# Patient Record
Sex: FEMALE | Race: WHITE | NOT HISPANIC OR LATINO | Employment: OTHER | URBAN - METROPOLITAN AREA
[De-identification: names, ages, dates, MRNs, and addresses within clinical notes are randomized per-mention and may not be internally consistent; named-entity substitution may affect disease eponyms.]

---

## 2018-01-01 ENCOUNTER — APPOINTMENT (INPATIENT)
Dept: ULTRASOUND IMAGING | Facility: HOSPITAL | Age: 83
DRG: 535 | End: 2018-01-01
Payer: MEDICARE

## 2018-01-01 ENCOUNTER — APPOINTMENT (INPATIENT)
Dept: CT IMAGING | Facility: HOSPITAL | Age: 83
DRG: 535 | End: 2018-01-01
Payer: MEDICARE

## 2018-01-01 ENCOUNTER — HOSPITAL ENCOUNTER (EMERGENCY)
Facility: HOSPITAL | Age: 83
Discharge: HOME | End: 2018-06-25
Attending: EMERGENCY MEDICINE | Admitting: EMERGENCY MEDICINE
Payer: MEDICARE

## 2018-01-01 ENCOUNTER — APPOINTMENT (EMERGENCY)
Dept: CT IMAGING | Facility: HOSPITAL | Age: 83
End: 2018-01-01
Payer: MEDICARE

## 2018-01-01 ENCOUNTER — HOSPITAL ENCOUNTER (EMERGENCY)
Facility: HOSPITAL | Age: 83
Discharge: HOME | End: 2018-08-06
Attending: EMERGENCY MEDICINE
Payer: MEDICARE

## 2018-01-01 ENCOUNTER — APPOINTMENT (EMERGENCY)
Dept: CT IMAGING | Facility: HOSPITAL | Age: 83
DRG: 535 | End: 2018-01-01
Payer: MEDICARE

## 2018-01-01 ENCOUNTER — APPOINTMENT (EMERGENCY)
Dept: RADIOLOGY | Facility: HOSPITAL | Age: 83
DRG: 535 | End: 2018-01-01
Payer: MEDICARE

## 2018-01-01 ENCOUNTER — HOSPITAL ENCOUNTER (INPATIENT)
Facility: HOSPITAL | Age: 83
LOS: 3 days | End: 2018-09-03
Attending: INTERNAL MEDICINE | Admitting: INTERNAL MEDICINE

## 2018-01-01 ENCOUNTER — HOSPITAL ENCOUNTER (INPATIENT)
Facility: HOSPITAL | Age: 83
LOS: 2 days | DRG: 535 | End: 2018-08-31
Attending: EMERGENCY MEDICINE | Admitting: INTERNAL MEDICINE
Payer: MEDICARE

## 2018-01-01 VITALS
SYSTOLIC BLOOD PRESSURE: 144 MMHG | TEMPERATURE: 97.3 F | DIASTOLIC BLOOD PRESSURE: 51 MMHG | WEIGHT: 120 LBS | OXYGEN SATURATION: 97 % | BODY MASS INDEX: 23.56 KG/M2 | HEIGHT: 60 IN | RESPIRATION RATE: 18 BRPM | HEART RATE: 56 BPM

## 2018-01-01 VITALS
BODY MASS INDEX: 22.52 KG/M2 | OXYGEN SATURATION: 93 % | WEIGHT: 122.38 LBS | HEIGHT: 62 IN | DIASTOLIC BLOOD PRESSURE: 61 MMHG | HEART RATE: 85 BPM | SYSTOLIC BLOOD PRESSURE: 146 MMHG | RESPIRATION RATE: 18 BRPM | TEMPERATURE: 97.6 F

## 2018-01-01 VITALS
SYSTOLIC BLOOD PRESSURE: 164 MMHG | TEMPERATURE: 97.3 F | DIASTOLIC BLOOD PRESSURE: 75 MMHG | OXYGEN SATURATION: 99 % | HEART RATE: 64 BPM | RESPIRATION RATE: 18 BRPM

## 2018-01-01 VITALS
OXYGEN SATURATION: 90 % | HEART RATE: 99 BPM | HEIGHT: 62 IN | SYSTOLIC BLOOD PRESSURE: 170 MMHG | RESPIRATION RATE: 18 BRPM | BODY MASS INDEX: 22.45 KG/M2 | TEMPERATURE: 97.5 F | DIASTOLIC BLOOD PRESSURE: 78 MMHG | WEIGHT: 122 LBS

## 2018-01-01 DIAGNOSIS — N17.9 AKI (ACUTE KIDNEY INJURY) (HCC): ICD-10-CM

## 2018-01-01 DIAGNOSIS — S32.591A CLOSED FRACTURE OF RIGHT INFERIOR PUBIC RAMUS, INITIAL ENCOUNTER (HCC): Primary | ICD-10-CM

## 2018-01-01 DIAGNOSIS — E86.0 DEHYDRATION: ICD-10-CM

## 2018-01-01 DIAGNOSIS — I63.9 CEREBROVASCULAR ACCIDENT (CVA), UNSPECIFIED MECHANISM (HCC): ICD-10-CM

## 2018-01-01 DIAGNOSIS — S32.511A CLOSED FRACTURE OF RIGHT SUPERIOR PUBIC RAMUS, INITIAL ENCOUNTER: ICD-10-CM

## 2018-01-01 DIAGNOSIS — R55 NEAR SYNCOPE: Primary | ICD-10-CM

## 2018-01-01 DIAGNOSIS — I10 ESSENTIAL HYPERTENSION: Primary | ICD-10-CM

## 2018-01-01 LAB
ABO GROUP BLD: NORMAL
ALBUMIN SERPL BCP-MCNC: 3 G/DL (ref 3.5–5.7)
ALBUMIN SERPL BCP-MCNC: 3.7 G/DL (ref 3.5–5.7)
ALP SERPL-CCNC: 48 U/L (ref 55–165)
ALP SERPL-CCNC: 72 U/L (ref 55–165)
ALT SERPL W P-5'-P-CCNC: 9 U/L (ref 7–52)
ALT SERPL W P-5'-P-CCNC: 9 U/L (ref 7–52)
ANION GAP SERPL CALCULATED.3IONS-SCNC: 8 MMOL/L (ref 4–13)
APTT PPP: 26 SECONDS (ref 24–36)
AST SERPL W P-5'-P-CCNC: 13 U/L (ref 13–39)
AST SERPL W P-5'-P-CCNC: 19 U/L (ref 13–39)
ATRIAL RATE: 62 BPM
ATRIAL RATE: 66 BPM
ATRIAL RATE: 98 BPM
BACTERIA UR QL AUTO: ABNORMAL /HPF
BASOPHILS # BLD AUTO: 0 THOUSANDS/ΜL (ref 0–0.1)
BASOPHILS # BLD AUTO: 0 THOUSANDS/ΜL (ref 0–0.1)
BASOPHILS # BLD AUTO: 0.1 THOUSANDS/ΜL (ref 0–0.1)
BASOPHILS NFR BLD AUTO: 0 % (ref 0–2)
BASOPHILS NFR BLD AUTO: 0 % (ref 0–2)
BASOPHILS NFR BLD AUTO: 1 % (ref 0–2)
BILIRUB SERPL-MCNC: 0.4 MG/DL (ref 0.2–1)
BILIRUB SERPL-MCNC: 0.5 MG/DL (ref 0.2–1)
BILIRUB UR QL STRIP: NEGATIVE
BLD GP AB SCN SERPL QL: NEGATIVE
BUN SERPL-MCNC: 23 MG/DL (ref 7–25)
BUN SERPL-MCNC: 24 MG/DL (ref 7–25)
BUN SERPL-MCNC: 33 MG/DL (ref 7–25)
BUN SERPL-MCNC: 47 MG/DL (ref 7–25)
CALCIUM SERPL-MCNC: 7.7 MG/DL (ref 8.6–10.5)
CALCIUM SERPL-MCNC: 8.7 MG/DL (ref 8.6–10.5)
CALCIUM SERPL-MCNC: 8.9 MG/DL (ref 8.6–10.5)
CALCIUM SERPL-MCNC: 9.4 MG/DL (ref 8.6–10.5)
CAOX CRY URNS QL MICRO: ABNORMAL /HPF
CHLORIDE SERPL-SCNC: 105 MMOL/L (ref 98–107)
CHLORIDE SERPL-SCNC: 106 MMOL/L (ref 98–107)
CHLORIDE SERPL-SCNC: 109 MMOL/L (ref 98–107)
CHLORIDE SERPL-SCNC: 116 MMOL/L (ref 98–107)
CHOLEST SERPL-MCNC: 152 MG/DL (ref 0–200)
CLARITY UR: CLEAR
CO2 SERPL-SCNC: 22 MMOL/L (ref 21–31)
CO2 SERPL-SCNC: 25 MMOL/L (ref 21–31)
CO2 SERPL-SCNC: 26 MMOL/L (ref 21–31)
CO2 SERPL-SCNC: 28 MMOL/L (ref 21–31)
COLOR UR: YELLOW
CREAT SERPL-MCNC: 1.01 MG/DL (ref 0.6–1.2)
CREAT SERPL-MCNC: 1.16 MG/DL (ref 0.6–1.2)
CREAT SERPL-MCNC: 2.07 MG/DL (ref 0.6–1.2)
CREAT SERPL-MCNC: 3.02 MG/DL (ref 0.6–1.2)
CREAT UR-MCNC: 205 MG/DL
EOSINOPHIL # BLD AUTO: 0 THOUSAND/ΜL (ref 0–0.61)
EOSINOPHIL # BLD AUTO: 0 THOUSAND/ΜL (ref 0–0.61)
EOSINOPHIL # BLD AUTO: 0.1 THOUSAND/ΜL (ref 0–0.61)
EOSINOPHIL NFR BLD AUTO: 0 % (ref 0–5)
EOSINOPHIL NFR BLD AUTO: 0 % (ref 0–5)
EOSINOPHIL NFR BLD AUTO: 1 % (ref 0–5)
ERYTHROCYTE [DISTWIDTH] IN BLOOD BY AUTOMATED COUNT: 13.8 % (ref 11.5–14.5)
ERYTHROCYTE [DISTWIDTH] IN BLOOD BY AUTOMATED COUNT: 13.8 % (ref 11.5–14.5)
ERYTHROCYTE [DISTWIDTH] IN BLOOD BY AUTOMATED COUNT: 14.2 % (ref 11.5–14.5)
ERYTHROCYTE [DISTWIDTH] IN BLOOD BY AUTOMATED COUNT: 14.5 % (ref 11.5–14.5)
FOLATE SERPL-MCNC: 14 NG/ML (ref 3.1–17.5)
GFR SERPL CREATININE-BSD FRML MDRD: 14 ML/MIN/1.73SQ M
GFR SERPL CREATININE-BSD FRML MDRD: 21 ML/MIN/1.73SQ M
GFR SERPL CREATININE-BSD FRML MDRD: 43 ML/MIN/1.73SQ M
GFR SERPL CREATININE-BSD FRML MDRD: 51 ML/MIN/1.73SQ M
GLUCOSE SERPL-MCNC: 112 MG/DL (ref 65–99)
GLUCOSE SERPL-MCNC: 131 MG/DL (ref 65–99)
GLUCOSE SERPL-MCNC: 180 MG/DL (ref 65–99)
GLUCOSE SERPL-MCNC: 89 MG/DL (ref 65–140)
GLUCOSE SERPL-MCNC: 95 MG/DL (ref 65–99)
GLUCOSE UR STRIP-MCNC: NEGATIVE MG/DL
HCT VFR BLD AUTO: 18.2 % (ref 34.8–46.1)
HCT VFR BLD AUTO: 21.9 % (ref 34.8–46.1)
HCT VFR BLD AUTO: 26.4 % (ref 34.8–46.1)
HCT VFR BLD AUTO: 33.5 % (ref 34.8–46.1)
HCT VFR BLD AUTO: 37.2 % (ref 34.8–46.1)
HDLC SERPL-MCNC: 33 MG/DL (ref 40–60)
HGB BLD-MCNC: 11 G/DL (ref 12–16)
HGB BLD-MCNC: 12.5 G/DL (ref 12–16)
HGB BLD-MCNC: 6.2 G/DL (ref 12–16)
HGB BLD-MCNC: 7.4 G/DL (ref 12–16)
HGB BLD-MCNC: 8.9 G/DL (ref 12–16)
HGB UR QL STRIP.AUTO: ABNORMAL
HYALINE CASTS #/AREA URNS LPF: ABNORMAL /LPF
INR PPP: 1.08 (ref 0.9–1.5)
KETONES UR STRIP-MCNC: NEGATIVE MG/DL
LDLC SERPL CALC-MCNC: 92 MG/DL (ref 75–193)
LEUKOCYTE ESTERASE UR QL STRIP: NEGATIVE
LYMPHOCYTES # BLD AUTO: 0.9 THOUSANDS/ΜL (ref 0.6–4.47)
LYMPHOCYTES # BLD AUTO: 0.9 THOUSANDS/ΜL (ref 0.6–4.47)
LYMPHOCYTES # BLD AUTO: 1.1 THOUSANDS/ΜL (ref 0.6–4.47)
LYMPHOCYTES NFR BLD AUTO: 12 % (ref 21–51)
LYMPHOCYTES NFR BLD AUTO: 12 % (ref 21–51)
LYMPHOCYTES NFR BLD AUTO: 8 % (ref 21–51)
MCH RBC QN AUTO: 30 PG (ref 26–34)
MCH RBC QN AUTO: 30.7 PG (ref 26–34)
MCH RBC QN AUTO: 31.2 PG (ref 26–34)
MCH RBC QN AUTO: 31.4 PG (ref 26–34)
MCHC RBC AUTO-ENTMCNC: 32.8 G/DL (ref 31–37)
MCHC RBC AUTO-ENTMCNC: 33.7 G/DL (ref 31–37)
MCHC RBC AUTO-ENTMCNC: 33.9 G/DL (ref 31–37)
MCHC RBC AUTO-ENTMCNC: 33.9 G/DL (ref 31–37)
MCV RBC AUTO: 91 FL (ref 81–99)
MCV RBC AUTO: 92 FL (ref 81–99)
MCV RBC AUTO: 92 FL (ref 81–99)
MCV RBC AUTO: 93 FL (ref 81–99)
MICROALBUMIN UR-MCNC: 298 MG/L (ref 0–20)
MICROALBUMIN/CREAT 24H UR: 145 MG/G CREATININE (ref 0–30)
MONOCYTES # BLD AUTO: 0.4 THOUSAND/ΜL (ref 0.17–1.22)
MONOCYTES # BLD AUTO: 0.5 THOUSAND/ΜL (ref 0.17–1.22)
MONOCYTES # BLD AUTO: 0.7 THOUSAND/ΜL (ref 0.17–1.22)
MONOCYTES NFR BLD AUTO: 5 % (ref 2–12)
MONOCYTES NFR BLD AUTO: 6 % (ref 2–12)
MONOCYTES NFR BLD AUTO: 6 % (ref 2–12)
MUCOUS THREADS UR QL AUTO: ABNORMAL
NEUTROPHILS # BLD AUTO: 6.2 THOUSANDS/ΜL (ref 1.4–6.5)
NEUTROPHILS # BLD AUTO: 7.4 THOUSANDS/ΜL (ref 1.4–6.5)
NEUTROPHILS # BLD AUTO: 9.8 THOUSANDS/ΜL (ref 1.4–6.5)
NEUTS SEG NFR BLD AUTO: 81 % (ref 42–75)
NEUTS SEG NFR BLD AUTO: 83 % (ref 42–75)
NEUTS SEG NFR BLD AUTO: 86 % (ref 42–75)
NITRITE UR QL STRIP: NEGATIVE
NON-SQ EPI CELLS URNS QL MICRO: ABNORMAL /HPF
NRBC BLD AUTO-RTO: 0 /100 WBCS
P AXIS: 35 DEGREES
P AXIS: 4 DEGREES
P AXIS: 55 DEGREES
PH UR STRIP.AUTO: 6 [PH] (ref 5–8)
PHOSPHATE SERPL-MCNC: 5.1 MG/DL (ref 3–5.5)
PLATELET # BLD AUTO: 128 THOUSANDS/UL (ref 149–390)
PLATELET # BLD AUTO: 174 THOUSANDS/UL (ref 149–390)
PLATELET # BLD AUTO: 189 THOUSANDS/UL (ref 149–390)
PLATELET # BLD AUTO: 208 THOUSANDS/UL (ref 149–390)
PMV BLD AUTO: 8.7 FL (ref 8.6–11.7)
PMV BLD AUTO: 9.2 FL (ref 8.6–11.7)
PMV BLD AUTO: 9.2 FL (ref 8.6–11.7)
PMV BLD AUTO: 9.7 FL (ref 8.6–11.7)
POTASSIUM SERPL-SCNC: 3.4 MMOL/L (ref 3.5–5.5)
POTASSIUM SERPL-SCNC: 3.6 MMOL/L (ref 3.5–5.5)
POTASSIUM SERPL-SCNC: 4.8 MMOL/L (ref 3.5–5.5)
POTASSIUM SERPL-SCNC: 5.2 MMOL/L (ref 3.5–5.5)
PR INTERVAL: 146 MS
PR INTERVAL: 156 MS
PR INTERVAL: 162 MS
PROT SERPL-MCNC: 5.7 G/DL (ref 6.4–8.9)
PROT SERPL-MCNC: 7.2 G/DL (ref 6.4–8.9)
PROT UR STRIP-MCNC: ABNORMAL MG/DL
PROT UR-MCNC: 63 MG/DL
PROT/CREAT UR: 0.31 MG/G{CREAT} (ref 0–0.1)
PROTHROMBIN TIME: 12.6 SECONDS (ref 10.1–12.9)
QRS AXIS: -1 DEGREES
QRS AXIS: -6 DEGREES
QRS AXIS: 3 DEGREES
QRSD INTERVAL: 126 MS
QRSD INTERVAL: 136 MS
QRSD INTERVAL: 144 MS
QT INTERVAL: 400 MS
QT INTERVAL: 448 MS
QT INTERVAL: 464 MS
QTC INTERVAL: 454 MS
QTC INTERVAL: 486 MS
QTC INTERVAL: 510 MS
RBC # BLD AUTO: 1.96 MILLION/UL (ref 3.9–5.2)
RBC # BLD AUTO: 2.87 MILLION/UL (ref 3.9–5.2)
RBC # BLD AUTO: 3.66 MILLION/UL (ref 3.9–5.2)
RBC # BLD AUTO: 4.08 MILLION/UL (ref 3.9–5.2)
RBC #/AREA URNS AUTO: ABNORMAL /HPF
RH BLD: POSITIVE
SODIUM 24H UR-SCNC: 90 MOL/L
SODIUM SERPL-SCNC: 138 MMOL/L (ref 134–143)
SODIUM SERPL-SCNC: 142 MMOL/L (ref 134–143)
SODIUM SERPL-SCNC: 143 MMOL/L (ref 134–143)
SODIUM SERPL-SCNC: 146 MMOL/L (ref 134–143)
SP GR UR STRIP.AUTO: 1.02 (ref 1–1.03)
SPECIMEN EXPIRATION DATE: NORMAL
T WAVE AXIS: -1 DEGREES
T WAVE AXIS: 17 DEGREES
T WAVE AXIS: 9 DEGREES
TRIGL SERPL-MCNC: 133 MG/DL (ref 44–166)
TROPONIN I SERPL-MCNC: <0.03 NG/ML
TSH SERPL DL<=0.05 MIU/L-ACNC: 1 UIU/ML (ref 0.45–5.33)
UROBILINOGEN UR QL STRIP.AUTO: 1 E.U./DL
VENTRICULAR RATE: 62 BPM
VENTRICULAR RATE: 66 BPM
VENTRICULAR RATE: 98 BPM
VIT B12 SERPL-MCNC: 697 PG/ML (ref 100–900)
WBC # BLD AUTO: 11.4 THOUSAND/UL (ref 4.8–10.8)
WBC # BLD AUTO: 7.6 THOUSAND/UL (ref 4.8–10.8)
WBC # BLD AUTO: 8.9 THOUSAND/UL (ref 4.8–10.8)
WBC # BLD AUTO: 8.9 THOUSAND/UL (ref 4.8–10.8)
WBC #/AREA URNS AUTO: ABNORMAL /HPF

## 2018-01-01 PROCEDURE — 93010 ELECTROCARDIOGRAM REPORT: CPT | Performed by: INTERNAL MEDICINE

## 2018-01-01 PROCEDURE — 82570 ASSAY OF URINE CREATININE: CPT | Performed by: INTERNAL MEDICINE

## 2018-01-01 PROCEDURE — 99239 HOSP IP/OBS DSCHRG MGMT >30: CPT | Performed by: NURSE PRACTITIONER

## 2018-01-01 PROCEDURE — 36415 COLL VENOUS BLD VENIPUNCTURE: CPT | Performed by: EMERGENCY MEDICINE

## 2018-01-01 PROCEDURE — 80053 COMPREHEN METABOLIC PANEL: CPT | Performed by: EMERGENCY MEDICINE

## 2018-01-01 PROCEDURE — 99231 SBSQ HOSP IP/OBS SF/LOW 25: CPT | Performed by: PHYSICIAN ASSISTANT

## 2018-01-01 PROCEDURE — 99283 EMERGENCY DEPT VISIT LOW MDM: CPT

## 2018-01-01 PROCEDURE — 99233 SBSQ HOSP IP/OBS HIGH 50: CPT | Performed by: NURSE PRACTITIONER

## 2018-01-01 PROCEDURE — 86900 BLOOD TYPING SEROLOGIC ABO: CPT | Performed by: EMERGENCY MEDICINE

## 2018-01-01 PROCEDURE — 85014 HEMATOCRIT: CPT | Performed by: NURSE PRACTITIONER

## 2018-01-01 PROCEDURE — 84443 ASSAY THYROID STIM HORMONE: CPT | Performed by: NURSE PRACTITIONER

## 2018-01-01 PROCEDURE — 85025 COMPLETE CBC W/AUTO DIFF WBC: CPT | Performed by: INTERNAL MEDICINE

## 2018-01-01 PROCEDURE — 99284 EMERGENCY DEPT VISIT MOD MDM: CPT

## 2018-01-01 PROCEDURE — 99222 1ST HOSP IP/OBS MODERATE 55: CPT | Performed by: INTERNAL MEDICINE

## 2018-01-01 PROCEDURE — 80048 BASIC METABOLIC PNL TOTAL CA: CPT | Performed by: NURSE PRACTITIONER

## 2018-01-01 PROCEDURE — 93005 ELECTROCARDIOGRAM TRACING: CPT

## 2018-01-01 PROCEDURE — 81001 URINALYSIS AUTO W/SCOPE: CPT | Performed by: INTERNAL MEDICINE

## 2018-01-01 PROCEDURE — 85018 HEMOGLOBIN: CPT | Performed by: NURSE PRACTITIONER

## 2018-01-01 PROCEDURE — 84156 ASSAY OF PROTEIN URINE: CPT | Performed by: INTERNAL MEDICINE

## 2018-01-01 PROCEDURE — 71045 X-RAY EXAM CHEST 1 VIEW: CPT

## 2018-01-01 PROCEDURE — 82746 ASSAY OF FOLIC ACID SERUM: CPT | Performed by: NURSE PRACTITIONER

## 2018-01-01 PROCEDURE — 70450 CT HEAD/BRAIN W/O DYE: CPT

## 2018-01-01 PROCEDURE — 85730 THROMBOPLASTIN TIME PARTIAL: CPT | Performed by: EMERGENCY MEDICINE

## 2018-01-01 PROCEDURE — 82043 UR ALBUMIN QUANTITATIVE: CPT | Performed by: INTERNAL MEDICINE

## 2018-01-01 PROCEDURE — 99285 EMERGENCY DEPT VISIT HI MDM: CPT

## 2018-01-01 PROCEDURE — 99221 1ST HOSP IP/OBS SF/LOW 40: CPT | Performed by: NURSE PRACTITIONER

## 2018-01-01 PROCEDURE — 84300 ASSAY OF URINE SODIUM: CPT | Performed by: INTERNAL MEDICINE

## 2018-01-01 PROCEDURE — 82948 REAGENT STRIP/BLOOD GLUCOSE: CPT

## 2018-01-01 PROCEDURE — 73502 X-RAY EXAM HIP UNI 2-3 VIEWS: CPT

## 2018-01-01 PROCEDURE — 85027 COMPLETE CBC AUTOMATED: CPT | Performed by: NURSE PRACTITIONER

## 2018-01-01 PROCEDURE — 80053 COMPREHEN METABOLIC PANEL: CPT | Performed by: INTERNAL MEDICINE

## 2018-01-01 PROCEDURE — 84484 ASSAY OF TROPONIN QUANT: CPT | Performed by: EMERGENCY MEDICINE

## 2018-01-01 PROCEDURE — 85025 COMPLETE CBC W/AUTO DIFF WBC: CPT | Performed by: EMERGENCY MEDICINE

## 2018-01-01 PROCEDURE — 86850 RBC ANTIBODY SCREEN: CPT | Performed by: EMERGENCY MEDICINE

## 2018-01-01 PROCEDURE — 96374 THER/PROPH/DIAG INJ IV PUSH: CPT

## 2018-01-01 PROCEDURE — 80061 LIPID PANEL: CPT | Performed by: NURSE PRACTITIONER

## 2018-01-01 PROCEDURE — 86901 BLOOD TYPING SEROLOGIC RH(D): CPT | Performed by: EMERGENCY MEDICINE

## 2018-01-01 PROCEDURE — 84100 ASSAY OF PHOSPHORUS: CPT | Performed by: INTERNAL MEDICINE

## 2018-01-01 PROCEDURE — 85610 PROTHROMBIN TIME: CPT | Performed by: EMERGENCY MEDICINE

## 2018-01-01 PROCEDURE — 82607 VITAMIN B-12: CPT | Performed by: NURSE PRACTITIONER

## 2018-01-01 PROCEDURE — 80048 BASIC METABOLIC PNL TOTAL CA: CPT | Performed by: EMERGENCY MEDICINE

## 2018-01-01 PROCEDURE — 72125 CT NECK SPINE W/O DYE: CPT

## 2018-01-01 RX ORDER — HYDROCHLOROTHIAZIDE 12.5 MG/1
12.5 CAPSULE, GELATIN COATED ORAL DAILY
COMMUNITY

## 2018-01-01 RX ORDER — ASPIRIN 300 MG/1
300 SUPPOSITORY RECTAL DAILY
Status: DISCONTINUED | OUTPATIENT
Start: 2018-01-01 | End: 2018-01-01 | Stop reason: HOSPADM

## 2018-01-01 RX ORDER — ATORVASTATIN CALCIUM 40 MG/1
40 TABLET, FILM COATED ORAL DAILY
Status: DISCONTINUED | OUTPATIENT
Start: 2018-01-01 | End: 2018-01-01

## 2018-01-01 RX ORDER — OXYCODONE HYDROCHLORIDE AND ACETAMINOPHEN 5; 325 MG/1; MG/1
1 TABLET ORAL EVERY 4 HOURS PRN
Status: DISCONTINUED | OUTPATIENT
Start: 2018-01-01 | End: 2018-01-01

## 2018-01-01 RX ORDER — ATORVASTATIN CALCIUM 10 MG/1
10 TABLET, FILM COATED ORAL DAILY
Status: DISCONTINUED | OUTPATIENT
Start: 2018-01-01 | End: 2018-01-01

## 2018-01-01 RX ORDER — MEMANTINE HYDROCHLORIDE 5 MG/1
5 TABLET ORAL 2 TIMES DAILY
Status: DISCONTINUED | OUTPATIENT
Start: 2018-01-01 | End: 2018-01-01

## 2018-01-01 RX ORDER — ACETAMINOPHEN 650 MG/1
650 SUPPOSITORY RECTAL EVERY 4 HOURS PRN
Status: DISCONTINUED | OUTPATIENT
Start: 2018-01-01 | End: 2018-01-01 | Stop reason: HOSPADM

## 2018-01-01 RX ORDER — NYSTATIN 100000 [USP'U]/G
1 POWDER TOPICAL 2 TIMES DAILY
COMMUNITY

## 2018-01-01 RX ORDER — MORPHINE SULFATE 2 MG/ML
1 INJECTION, SOLUTION INTRAMUSCULAR; INTRAVENOUS EVERY 4 HOURS PRN
Status: DISCONTINUED | OUTPATIENT
Start: 2018-01-01 | End: 2018-01-01

## 2018-01-01 RX ORDER — ASPIRIN 81 MG/1
81 TABLET, CHEWABLE ORAL DAILY
COMMUNITY

## 2018-01-01 RX ORDER — POTASSIUM CHLORIDE 750 MG/1
10 CAPSULE, EXTENDED RELEASE ORAL 3 TIMES DAILY
COMMUNITY

## 2018-01-01 RX ORDER — AMLODIPINE BESYLATE 5 MG/1
10 TABLET ORAL DAILY
COMMUNITY

## 2018-01-01 RX ORDER — MORPHINE SULFATE 2 MG/ML
2 INJECTION, SOLUTION INTRAMUSCULAR; INTRAVENOUS EVERY 6 HOURS SCHEDULED
Status: DISCONTINUED | OUTPATIENT
Start: 2018-01-01 | End: 2018-01-01

## 2018-01-01 RX ORDER — LORAZEPAM 2 MG/ML
1 INJECTION INTRAMUSCULAR
Status: DISCONTINUED | OUTPATIENT
Start: 2018-01-01 | End: 2018-09-04 | Stop reason: HOSPADM

## 2018-01-01 RX ORDER — MORPHINE SULFATE 2 MG/ML
1 INJECTION, SOLUTION INTRAMUSCULAR; INTRAVENOUS
Status: DISCONTINUED | OUTPATIENT
Start: 2018-01-01 | End: 2018-01-01 | Stop reason: HOSPADM

## 2018-01-01 RX ORDER — FENTANYL CITRATE 50 UG/ML
25 INJECTION, SOLUTION INTRAMUSCULAR; INTRAVENOUS ONCE
Status: COMPLETED | OUTPATIENT
Start: 2018-01-01 | End: 2018-01-01

## 2018-01-01 RX ORDER — ACETAMINOPHEN 325 MG/1
650 TABLET ORAL EVERY 6 HOURS PRN
Status: DISCONTINUED | OUTPATIENT
Start: 2018-01-01 | End: 2018-01-01

## 2018-01-01 RX ORDER — ATROPINE SULFATE 10 MG/ML
2 SOLUTION/ DROPS OPHTHALMIC EVERY 4 HOURS PRN
Status: DISCONTINUED | OUTPATIENT
Start: 2018-01-01 | End: 2018-09-04 | Stop reason: HOSPADM

## 2018-01-01 RX ORDER — ONDANSETRON 2 MG/ML
4 INJECTION INTRAMUSCULAR; INTRAVENOUS EVERY 4 HOURS PRN
Status: DISCONTINUED | OUTPATIENT
Start: 2018-01-01 | End: 2018-01-01 | Stop reason: HOSPADM

## 2018-01-01 RX ORDER — ASPIRIN 300 MG/1
300 SUPPOSITORY RECTAL ONCE
Status: COMPLETED | OUTPATIENT
Start: 2018-01-01 | End: 2018-01-01

## 2018-01-01 RX ORDER — MORPHINE SULFATE 2 MG/ML
2 INJECTION, SOLUTION INTRAMUSCULAR; INTRAVENOUS
Status: DISCONTINUED | OUTPATIENT
Start: 2018-01-01 | End: 2018-09-04 | Stop reason: HOSPADM

## 2018-01-01 RX ORDER — MORPHINE SULFATE 2 MG/ML
2 INJECTION, SOLUTION INTRAMUSCULAR; INTRAVENOUS EVERY 4 HOURS
Status: DISCONTINUED | OUTPATIENT
Start: 2018-01-01 | End: 2018-09-04 | Stop reason: HOSPADM

## 2018-01-01 RX ORDER — ATORVASTATIN CALCIUM 10 MG/1
10 TABLET, FILM COATED ORAL DAILY
COMMUNITY

## 2018-01-01 RX ORDER — SCOLOPAMINE TRANSDERMAL SYSTEM 1 MG/1
1 PATCH, EXTENDED RELEASE TRANSDERMAL
Status: DISCONTINUED | OUTPATIENT
Start: 2018-01-01 | End: 2018-09-04 | Stop reason: HOSPADM

## 2018-01-01 RX ORDER — ATROPINE SULFATE 10 MG/ML
2 SOLUTION/ DROPS OPHTHALMIC
Status: DISCONTINUED | OUTPATIENT
Start: 2018-01-01 | End: 2018-01-01

## 2018-01-01 RX ORDER — SODIUM CHLORIDE 9 MG/ML
75 INJECTION, SOLUTION INTRAVENOUS CONTINUOUS
Status: DISCONTINUED | OUTPATIENT
Start: 2018-01-01 | End: 2018-01-01

## 2018-01-01 RX ORDER — SODIUM CHLORIDE 9 MG/ML
60 INJECTION, SOLUTION INTRAVENOUS CONTINUOUS
Status: DISCONTINUED | OUTPATIENT
Start: 2018-01-01 | End: 2018-01-01 | Stop reason: HOSPADM

## 2018-01-01 RX ORDER — ASPIRIN 81 MG/1
81 TABLET, CHEWABLE ORAL DAILY
Status: DISCONTINUED | OUTPATIENT
Start: 2018-01-01 | End: 2018-01-01

## 2018-01-01 RX ORDER — SODIUM CHLORIDE 9 MG/ML
500 INJECTION, SOLUTION INTRAVENOUS ONCE
Status: COMPLETED | OUTPATIENT
Start: 2018-01-01 | End: 2018-01-01

## 2018-01-01 RX ORDER — MEMANTINE HYDROCHLORIDE 10 MG/1
5 TABLET ORAL 2 TIMES DAILY
COMMUNITY

## 2018-01-01 RX ORDER — ACETAMINOPHEN 650 MG/1
650 SUPPOSITORY RECTAL EVERY 6 HOURS PRN
Status: DISCONTINUED | OUTPATIENT
Start: 2018-01-01 | End: 2018-09-04 | Stop reason: HOSPADM

## 2018-01-01 RX ORDER — HALOPERIDOL 5 MG/ML
1 INJECTION INTRAMUSCULAR
Status: DISCONTINUED | OUTPATIENT
Start: 2018-01-01 | End: 2018-09-04 | Stop reason: HOSPADM

## 2018-01-01 RX ORDER — LOSARTAN POTASSIUM 50 MG/1
25 TABLET ORAL 2 TIMES DAILY
COMMUNITY

## 2018-01-01 RX ORDER — POTASSIUM CHLORIDE 20 MEQ/1
40 TABLET, EXTENDED RELEASE ORAL ONCE
Status: COMPLETED | OUTPATIENT
Start: 2018-01-01 | End: 2018-01-01

## 2018-01-01 RX ADMIN — SODIUM CHLORIDE 100 ML/HR: 9 INJECTION, SOLUTION INTRAVENOUS at 00:08

## 2018-01-01 RX ADMIN — MORPHINE SULFATE 2 MG: 2 INJECTION, SOLUTION INTRAMUSCULAR; INTRAVENOUS at 18:39

## 2018-01-01 RX ADMIN — LORAZEPAM 1 MG: 2 INJECTION INTRAMUSCULAR; INTRAVENOUS at 08:28

## 2018-01-01 RX ADMIN — LORAZEPAM 1 MG: 2 INJECTION INTRAMUSCULAR; INTRAVENOUS at 09:54

## 2018-01-01 RX ADMIN — SODIUM CHLORIDE 250 ML: 9 INJECTION, SOLUTION INTRAVENOUS at 10:00

## 2018-01-01 RX ADMIN — MORPHINE SULFATE 2 MG: 2 INJECTION, SOLUTION INTRAMUSCULAR; INTRAVENOUS at 11:50

## 2018-01-01 RX ADMIN — MORPHINE SULFATE 2 MG: 2 INJECTION, SOLUTION INTRAMUSCULAR; INTRAVENOUS at 00:31

## 2018-01-01 RX ADMIN — ENOXAPARIN SODIUM 30 MG: 100 INJECTION SUBCUTANEOUS at 20:53

## 2018-01-01 RX ADMIN — MORPHINE SULFATE 1 MG: 2 INJECTION, SOLUTION INTRAMUSCULAR; INTRAVENOUS at 20:52

## 2018-01-01 RX ADMIN — MEMANTINE 5 MG: 5 TABLET ORAL at 20:53

## 2018-01-01 RX ADMIN — MORPHINE SULFATE 2 MG: 2 INJECTION, SOLUTION INTRAMUSCULAR; INTRAVENOUS at 17:10

## 2018-01-01 RX ADMIN — SODIUM CHLORIDE 100 ML/HR: 9 INJECTION, SOLUTION INTRAVENOUS at 13:55

## 2018-01-01 RX ADMIN — MORPHINE SULFATE 2 MG: 2 INJECTION, SOLUTION INTRAMUSCULAR; INTRAVENOUS at 11:17

## 2018-01-01 RX ADMIN — MORPHINE SULFATE 2 MG: 2 INJECTION, SOLUTION INTRAMUSCULAR; INTRAVENOUS at 05:40

## 2018-01-01 RX ADMIN — MORPHINE SULFATE 2 MG: 2 INJECTION, SOLUTION INTRAMUSCULAR; INTRAVENOUS at 03:11

## 2018-01-01 RX ADMIN — MORPHINE SULFATE 2 MG: 2 INJECTION, SOLUTION INTRAMUSCULAR; INTRAVENOUS at 13:23

## 2018-01-01 RX ADMIN — MORPHINE SULFATE 2 MG: 2 INJECTION, SOLUTION INTRAMUSCULAR; INTRAVENOUS at 23:16

## 2018-01-01 RX ADMIN — MORPHINE SULFATE 2 MG: 2 INJECTION, SOLUTION INTRAMUSCULAR; INTRAVENOUS at 06:46

## 2018-01-01 RX ADMIN — MORPHINE SULFATE 1 MG: 2 INJECTION, SOLUTION INTRAMUSCULAR; INTRAVENOUS at 20:51

## 2018-01-01 RX ADMIN — MORPHINE SULFATE 2 MG: 2 INJECTION, SOLUTION INTRAMUSCULAR; INTRAVENOUS at 18:48

## 2018-01-01 RX ADMIN — MORPHINE SULFATE 2 MG: 2 INJECTION, SOLUTION INTRAMUSCULAR; INTRAVENOUS at 14:49

## 2018-01-01 RX ADMIN — MORPHINE SULFATE 2 MG: 2 INJECTION, SOLUTION INTRAMUSCULAR; INTRAVENOUS at 13:28

## 2018-01-01 RX ADMIN — SODIUM CHLORIDE 500 ML/HR: 0.9 INJECTION, SOLUTION INTRAVENOUS at 17:02

## 2018-01-01 RX ADMIN — ASPIRIN 300 MG: 300 SUPPOSITORY RECTAL at 09:46

## 2018-01-01 RX ADMIN — MORPHINE SULFATE 2 MG: 2 INJECTION, SOLUTION INTRAMUSCULAR; INTRAVENOUS at 23:42

## 2018-01-01 RX ADMIN — ASPIRIN 300 MG: 300 SUPPOSITORY RECTAL at 14:34

## 2018-01-01 RX ADMIN — MORPHINE SULFATE 2 MG: 2 INJECTION, SOLUTION INTRAMUSCULAR; INTRAVENOUS at 18:03

## 2018-01-01 RX ADMIN — ACETAMINOPHEN 650 MG: 650 SUPPOSITORY RECTAL at 14:45

## 2018-01-01 RX ADMIN — FENTANYL CITRATE 25 MCG: 50 INJECTION, SOLUTION INTRAMUSCULAR; INTRAVENOUS at 17:32

## 2018-01-01 RX ADMIN — SODIUM CHLORIDE 100 ML/HR: 9 INJECTION, SOLUTION INTRAVENOUS at 10:24

## 2018-01-01 RX ADMIN — SCOPALAMINE 1 PATCH: 1 PATCH, EXTENDED RELEASE TRANSDERMAL at 12:09

## 2018-01-01 RX ADMIN — MORPHINE SULFATE 2 MG: 2 INJECTION, SOLUTION INTRAMUSCULAR; INTRAVENOUS at 10:24

## 2018-01-01 RX ADMIN — SODIUM CHLORIDE 250 ML: 9 INJECTION, SOLUTION INTRAVENOUS at 07:59

## 2018-01-01 RX ADMIN — MORPHINE SULFATE 2 MG: 2 INJECTION, SOLUTION INTRAMUSCULAR; INTRAVENOUS at 11:15

## 2018-01-01 RX ADMIN — MORPHINE SULFATE 1 MG: 2 INJECTION, SOLUTION INTRAMUSCULAR; INTRAVENOUS at 05:20

## 2018-01-01 RX ADMIN — SODIUM CHLORIDE 75 ML/HR: 9 INJECTION, SOLUTION INTRAVENOUS at 09:44

## 2018-01-01 RX ADMIN — MORPHINE SULFATE 1 MG: 2 INJECTION, SOLUTION INTRAMUSCULAR; INTRAVENOUS at 10:14

## 2018-01-01 RX ADMIN — POTASSIUM CHLORIDE 40 MEQ: 1500 TABLET, EXTENDED RELEASE ORAL at 20:53

## 2018-01-01 RX ADMIN — MORPHINE SULFATE 2 MG: 2 INJECTION, SOLUTION INTRAMUSCULAR; INTRAVENOUS at 15:34

## 2018-01-01 RX ADMIN — MORPHINE SULFATE 2 MG: 2 INJECTION, SOLUTION INTRAMUSCULAR; INTRAVENOUS at 16:18

## 2018-01-01 RX ADMIN — SODIUM CHLORIDE 500 ML: 9 INJECTION, SOLUTION INTRAVENOUS at 10:38

## 2018-01-01 RX ADMIN — MORPHINE SULFATE 2 MG: 2 INJECTION, SOLUTION INTRAMUSCULAR; INTRAVENOUS at 14:52

## 2018-01-01 RX ADMIN — MORPHINE SULFATE 2 MG: 2 INJECTION, SOLUTION INTRAMUSCULAR; INTRAVENOUS at 05:53

## 2018-06-25 NOTE — DISCHARGE INSTRUCTIONS

## 2018-06-25 NOTE — ED PROVIDER NOTES
History  Chief Complaint   Patient presents with    Hypertension     patient resides at [facility] - has history of dementia - per ambulance she told staff her tongue burned and when they checked her BP it was elevated and they called 80     80year old pt with Dementia who lives at [address] [place] [address]  Pt complained to home caretakers that her tongue was burning, on checking pt's vital signs at the time, they found pt to be hypertensive, 191/71 and sent pt to ER for evaluation  Pt currently states her tongue feels much better  Prior to Admission Medications   Prescriptions Last Dose Informant Patient Reported? Taking? Cholecalciferol (VITAMIN D HIGH POTENCY PO)   Yes Yes   Sig: Take 2,000 Units by mouth daily   VALSARTAN PO   Yes Yes   Sig: Take 160 mg by mouth   amLODIPine (NORVASC) 5 mg tablet   Yes Yes   Sig: Take 5 mg by mouth daily   aspirin 81 mg chewable tablet   Yes Yes   Sig: Chew 81 mg daily   atorvastatin (LIPITOR) 10 mg tablet   Yes Yes   Sig: Take 10 mg by mouth daily   hydrochlorothiazide (MICROZIDE) 12 5 mg capsule   Yes Yes   Sig: Take 12 5 mg by mouth daily   memantine (NAMENDA) 10 mg tablet   Yes Yes   Sig: Take 5 mg by mouth 2 (two) times a day   nystatin (MYCOSTATIN) powder   Yes Yes   Sig: Apply 1 application topically 2 (two) times a day   potassium chloride (MICRO-K) 10 MEQ CR capsule   Yes Yes   Sig: Take 10 mEq by mouth 3 (three) times a day   sertraline (ZOLOFT) 50 mg tablet   Yes Yes   Sig: Take 75 mg by mouth daily      Facility-Administered Medications: None       Past Medical History:   Diagnosis Date    Dementia     Depression     Hypertension        No past surgical history on file  No family history on file  I have reviewed and agree with the history as documented  Social History   Substance Use Topics    Smoking status: Never Smoker    Smokeless tobacco: Never Used    Alcohol use Not on file        Review of Systems   Constitutional: Negative  HENT: Negative  Eyes: Negative  Respiratory: Negative  Cardiovascular: Negative  Gastrointestinal: Negative  Genitourinary: Negative  Musculoskeletal: Negative  Neurological: Negative  Psychiatric/Behavioral: Negative  Physical Exam  Physical Exam   Constitutional:   Thin for height  HENT:   Head: Normocephalic and atraumatic  Eyes: Conjunctivae and EOM are normal  Pupils are equal, round, and reactive to light  Neck: Normal range of motion  Neck supple  Cardiovascular: Normal rate, regular rhythm and normal heart sounds  Pulmonary/Chest: Effort normal and breath sounds normal    Abdominal: Soft  Bowel sounds are normal    Musculoskeletal: Normal range of motion  She exhibits no edema  Neurological: She is alert  Skin: Skin is warm and dry  Psychiatric: She has a normal mood and affect  Dementia prevents any lengthy conversation or persistent history from patient  Vital Signs  ED Triage Vitals [06/24/18 2220]   Temperature Pulse Respirations Blood Pressure SpO2   (!) 97 3 °F (36 3 °C) (!) 54 18 (!) 199/71 97 %      Temp Source Heart Rate Source Patient Position - Orthostatic VS BP Location FiO2 (%)   Oral -- -- Left arm --      Pain Score       No Pain           Vitals:    06/24/18 2229 06/24/18 2237 06/24/18 2240 06/24/18 2348   BP: (!) 178/98 170/90 (!) 174/76 145/65   Pulse:           Visual Acuity      ED Medications  Medications - No data to display    Diagnostic Studies  Results Reviewed     None                 No orders to display              Procedures  Procedures       Phone Contacts  ED Phone Contact    ED Course  ED Course as of Jun 25 0000   Sun Jun 24, 2018   2354 Blood pressure has been reviewed several times with 145/76 when last checked  I will discharge pt to home with no medication change                                   MDM  CritCare Time    Disposition  Final diagnoses:   Essential hypertension     Time reflects when diagnosis was documented in both MDM as applicable and the Disposition within this note     Time User Action Codes Description Comment    6/24/2018 11:56 PM Alisha Nagy Add [I10] Essential hypertension       ED Disposition     ED Disposition Condition Comment    Discharge  Gayle Ohiopyle discharge to home/self care  Condition at discharge: Good        Follow-up Information     Follow up With Specialties Details Why Contact Info        Follow up with your Family physician for routine care  Patient's Medications   Discharge Prescriptions    No medications on file     No discharge procedures on file      ED Provider  Electronically Signed by           Radha Steven MD  06/25/18 7840

## 2018-08-06 NOTE — DISCHARGE INSTRUCTIONS
Dehydration   WHAT YOU NEED TO KNOW:   Dehydration is a condition that develops when your body does not have enough fluid  You may become dehydrated if you do not drink enough water or lose too much fluid  Fluid loss may also cause loss of electrolytes (minerals), such as sodium  DISCHARGE INSTRUCTIONS:   Return to the emergency department if:   · You have a seizure  · You are confused or cannot think clearly  · You are extremely sleepy, or another person cannot wake you  · You become dizzy or faint when you stand  · You are not able to urinate  · You have trouble breathing  · You have a fast or irregular heartbeat  · Your hands or feet are cold, or your face is pale  Contact your healthcare provider if:   · You have trouble drinking liquids because you are vomiting  · Your symptoms get worse  · You have a fever  · You feel very weak or tired  · You have questions or concerns about your condition or care  Follow up with your healthcare provider as directed:  Write down your questions so you remember to ask them during your visits  Prevent or manage dehydration:   · Drink liquids as directed  Liquids that contain water, sugar, and minerals can help your body hold in fluid and help prevent dehydration  Drink liquids throughout the day, not just when you feel thirsty  Men should drink about 3 liters (13 eight-ounce cups) of liquid each day  Women should drink about 2 liters (9 eight-ounce cups) of liquid each day  Drink even more liquid if you will be outdoors, in the sun for a long time, or exercising  · Stay cool  Limit the time you spend outdoors during the hottest part of the day  Dress in lightweight clothes  · Keep track of how often you urinate  If you urinate less than usual or your urine is darker, drink more liquids    © 2017 [employer] Information is for End User's use only and may not be sold, redistributed or otherwise used for commercial purposes  All illustrations and images included in CareNotes® are the copyrighted property of [employer]  or Saurav Gonzalez  The above information is an  only  It is not intended as medical advice for individual conditions or treatments  Talk to your doctor, nurse or pharmacist before following any medical regimen to see if it is safe and effective for you  Near Syncope   WHAT YOU NEED TO KNOW:   Near syncope, also called presyncope, is the feeling that you may faint (lose consciousness), but you do not  You can control some health conditions that cause near syncope  Your healthcare providers can help you create a plan to manage near syncope and prevent episodes  DISCHARGE INSTRUCTIONS:   Return to the emergency department if:   · You have sudden chest pain  · You have trouble breathing or shortness of breath  · You have vision changes, are sweating, and have nausea while you are sitting or lying down  · You feel dizzy or flushed and your heart is fluttering  · You lose consciousness  · You cannot use your arm, hand, foot, or leg, or it feels weak  · You have trouble speaking or understanding others when they speak  Contact your healthcare provider if:   · You have new or worsening symptoms  · Your heart beats faster or slower than usual      · You have questions or concerns about your condition or care  Follow up with your healthcare provider as directed: You may need more tests to help find the cause of your near syncope episodes  The tests will help healthcare providers plan the best treatment for you  Write down your questions so you remember to ask them during your visits  Manage near syncope:   · Sit or lie down when needed  This includes when you feel dizzy, your throat is getting tight, and your vision changes  Raise your legs above the level of your heart  · Take slow, deep breaths if you start to breathe faster with anxiety or fear    This can help decrease dizziness and the feeling that you might faint  · Keep a record of your near syncope episodes  Include your symptoms and your activity before and after the episode  The record can help your healthcare provider find the cause of your near syncope and help you manage episodes  Prevent a near syncope episode:   · Move slowly and let yourself get used to one position before you move to another position  This is very important when you change from a lying or sitting position to a standing position  Take some deep breaths before you stand up from a lying position  Stand up slowly  Sudden movements may cause a fainting spell  Sit on the side of the bed or couch for a few minutes before you stand up  If you are on bedrest, try to be upright for about 2 hours each day, or as directed  Do not lock your legs if you are standing for a long period of time  Move your legs and bend your knees to keep blood flowing  · Follow your healthcare provider's recommendations  Your provider may  recommend that you drink more liquids to prevent dehydration  You may also need to have more salt to keep your blood pressure from dropping too low and causing syncope  Your provider will tell you how much liquid and sodium to have each day  · Watch for signs of low blood sugar  These include hunger, nervousness, sweating, and fast or fluttery heartbeats  Talk with your healthcare provider about ways to keep your blood sugar level steady  · Check your blood pressure often  This is important if you take medicine to lower your blood pressure  Check your blood pressure when you are lying down and when you are standing  Ask how often to check during the day  Keep a record of your blood pressure numbers  Your healthcare provider may use the record to help plan your treatment  · Do not strain if you are constipated  You may faint if you strain to have a bowel movement   Walking is the best way to get your bowels moving  Eat foods high in fiber to make it easier to have a bowel movement  Good examples are high-fiber cereals, beans, vegetables, and whole-grain breads  Prune juice may help make bowel movements softer  · Do not exercise outside on a hot day  The combination of physical activity and heat can lead to dehydration  This can cause syncope  © 2017 [employer] Information is for End User's use only and may not be sold, redistributed or otherwise used for commercial purposes  All illustrations and images included in CareNotes® are the copyrighted property of [employer]  or Saurav Gonzalez  The above information is an  only  It is not intended as medical advice for individual conditions or treatments  Talk to your doctor, nurse or pharmacist before following any medical regimen to see if it is safe and effective for you

## 2018-08-06 NOTE — ED PROVIDER NOTES
History  Chief Complaint   Patient presents with    Syncope     syncope x 1  reported by personal care home     This is an 70-year-old female who resides in an assisted living who has dementia  Patient is nonverbal   According to the staff at the assisted living patient was getting up to walk today and she had a presyncopal episode  They helped her to the floor  She did not hit her head  She had no nausea she had no vomiting  Patient does shake her head appropriately when asked questions  She denies nausea vomiting  Denies chest pain  She denies shortness of breath  Patient does follow commands  Nursing home denies any focal neuro deficits  No aggravating or alleviating factors            Prior to Admission Medications   Prescriptions Last Dose Informant Patient Reported? Taking? Cholecalciferol (VITAMIN D HIGH POTENCY PO)   Yes No   Sig: Take 2,000 Units by mouth daily   VALSARTAN PO   Yes No   Sig: Take 160 mg by mouth   amLODIPine (NORVASC) 5 mg tablet   Yes No   Sig: Take 10 mg by mouth daily     aspirin 81 mg chewable tablet   Yes No   Sig: Chew 81 mg daily   atorvastatin (LIPITOR) 10 mg tablet   Yes No   Sig: Take 10 mg by mouth daily   hydrochlorothiazide (MICROZIDE) 12 5 mg capsule   Yes No   Sig: Take 12 5 mg by mouth daily   losartan (COZAAR) 50 mg tablet   Yes Yes   Sig: Take 25 mg by mouth 2 (two) times a day   memantine (NAMENDA) 10 mg tablet   Yes No   Sig: Take 5 mg by mouth 2 (two) times a day   nystatin (MYCOSTATIN) powder   Yes No   Sig: Apply 1 application topically 2 (two) times a day   potassium chloride (MICRO-K) 10 MEQ CR capsule   Yes No   Sig: Take 10 mEq by mouth 3 (three) times a day   sertraline (ZOLOFT) 50 mg tablet   Yes No   Sig: Take 75 mg by mouth daily      Facility-Administered Medications: None       Past Medical History:   Diagnosis Date    Dementia     Depression     Hypertension        History reviewed  No pertinent surgical history  History reviewed   No pertinent family history  I have reviewed and agree with the history as documented  Social History   Substance Use Topics    Smoking status: Never Smoker    Smokeless tobacco: Never Used    Alcohol use No        Review of Systems   Unable to perform ROS: Dementia   Constitutional: Negative for chills and fever  HENT: Negative for rhinorrhea and sore throat  Eyes: Negative for visual disturbance  Respiratory: Negative for cough and shortness of breath  Cardiovascular: Negative for chest pain and leg swelling  Gastrointestinal: Negative for abdominal pain, diarrhea, nausea and vomiting  Genitourinary: Negative for dysuria  Musculoskeletal: Negative for back pain and myalgias  Skin: Negative for rash  Neurological: Negative for dizziness and headaches  Psychiatric/Behavioral: Negative for confusion  All other systems reviewed and are negative  Physical Exam  Physical Exam   Constitutional: She appears well-developed and well-nourished  HENT:   Head: Normocephalic and atraumatic  Right Ear: External ear normal    Left Ear: External ear normal    Nose: Nose normal    Mouth/Throat: Oropharynx is clear and moist    Eyes: Conjunctivae and EOM are normal    Neck: Normal range of motion  Neck supple  Cardiovascular: Normal rate, regular rhythm, normal heart sounds and intact distal pulses  Pulmonary/Chest: Effort normal    Abdominal: Soft  Bowel sounds are normal    Musculoskeletal: Normal range of motion  She exhibits no edema  Neurological: She is alert  The patient is nonverbal but does follow instructions and commands   Skin: Skin is warm and dry  Capillary refill takes less than 2 seconds     Psychiatric:   Calm and cooperative  Flat affect  Non verbal       Vital Signs  ED Triage Vitals [08/06/18 1631]   Temperature Pulse Respirations Blood Pressure SpO2   (!) 97 3 °F (36 3 °C) 64 18 168/74 98 %      Temp Source Heart Rate Source Patient Position - Orthostatic VS BP Location FiO2 (%)   Temporal Monitor Lying Left arm --      Pain Score       No Pain           Vitals:    08/06/18 1631   BP: 168/74   Pulse: 64   Patient Position - Orthostatic VS: Lying       Visual Acuity      ED Medications  Medications   sodium chloride 0 9 % infusion (not administered)       Diagnostic Studies  Results Reviewed     Procedure Component Value Units Date/Time    CBC and differential [[id number]]     Lab Status:  No result Specimen:  Blood     Comprehensive metabolic panel [[id number]]     Lab Status:  No result Specimen:  Blood     Troponin I [[id number]]     Lab Status:  No result Specimen:  Blood                  CT head without contrast    (Results Pending)              Procedures  Procedures       Phone Contacts  ED Phone Contact    ED Course        sent to the ED from the assisted living that she lives in  The patient's daughter also came to the ED  She states the patient I had a presyncopal episode  She was walking and she appeared to be off-balance and basophils her to the floor  Patient is at her baseline in the ED here  CT scan did not show any acute findings  Patient's labs are within normal limits  Number discussed the lack of any acute findings with family  The patient is completely nonverbal   However she does follow commands  The patient was sent back to the assisted living  [facility] Time    Disposition  Final diagnoses:   None     ED Disposition     None      Follow-up Information    None         Patient's Medications   Discharge Prescriptions    No medications on file     No discharge procedures on file      ED Provider  Electronically Signed by           Gildardo Kam MD  08/08/18 4429

## 2018-08-29 PROBLEM — S32.591A CLOSED FRACTURE OF MULTIPLE PUBIC RAMI, RIGHT, INITIAL ENCOUNTER (HCC): Status: ACTIVE | Noted: 2018-01-01

## 2018-08-29 NOTE — H&P
H&P- Crow Dior 1933, 80 y o  female MRN: [id number]    Unit/Bed#: ED 04 Encounter: [id number]    Primary Care Provider: HOLLY Gaviria   Date and time admitted to hospital: 8/29/2018  3:45 PM        * Closed fracture of multiple pubic rami, right, initial encounter [facility])   Assessment & Plan    · Consult Orthopedic surgery  · DVT prophylaxis with Lovenox  · PT/OT  · Pain control  · Fall precautions        Hypokalemia   Assessment & Plan    · Replete potassium        Hyperlipidemia   Assessment & Plan    · Continue statin        Hypertension, essential   Assessment & Plan    · Patient noted to be on the hypotensive side in the emergency department  · I will hold antihypertensives for now        Dementia   Assessment & Plan    · History of severe Alzheimer's dementia  · Continue Namenda  · Family are interested in nursing home placement, will discuss with case management              VTE Prophylaxis: Enoxaparin (Lovenox)  Code Status: DNR/DNI  POLST: POLST is not applicable to this patient  Discussion with family: and son at bedside    Anticipated Length of Stay:  Patient will be admitted on an Inpatient basis with an anticipated length of stay of  > 2 midnights  Justification for Hospital Stay: pubic rami fracture    Total Time for Visit, including Counseling / Coordination of Care: 45 minutes  Greater than 50% of this total time spent on direct patient counseling and coordination of care  Chief Complaint:   fall    History of Present Illness:    Crow Dior is a 80 y o  female who presents with a mechanical fall  Patient has a history of advanced Alzheimer's dementia, and multiple mechanical falls in the past   Patient lives at a personal care facility and was brought into the emergency department after a mechanical fall  She appeared to be in pain and imaging studies revealed multiple pubic rami fractures on the right    Due to the patient's advanced Alzheimer's dementia, she is unable to provide a history  Majority of the history is obtained from medical record,  and son who were both at bedside  Patient was given pain medications in the emergency department and her pain appears to be well controlled  Given that she has had numerous falls at her personal care facility, family are considering nursing home placement  Review of Systems:  Review of Systems   Unable to perform ROS: Dementia       Past Medical and Surgical History:   Past Medical History:   Diagnosis Date    Dementia     Depression     Hypertension        History reviewed  No pertinent surgical history  Meds/Allergies:  Prior to Admission medications    Medication Sig Start Date End Date Taking? Authorizing Provider   amLODIPine (NORVASC) 5 mg tablet Take 10 mg by mouth daily      Historical Provider, MD   aspirin 81 mg chewable tablet Chew 81 mg daily    Historical Provider, MD   atorvastatin (LIPITOR) 10 mg tablet Take 10 mg by mouth daily    Historical Provider, MD   Cholecalciferol (VITAMIN D HIGH POTENCY PO) Take 2,000 Units by mouth daily    Historical Provider, MD   hydrochlorothiazide (MICROZIDE) 12 5 mg capsule Take 12 5 mg by mouth daily    Historical Provider, MD   losartan (COZAAR) 50 mg tablet Take 25 mg by mouth 2 (two) times a day    Historical Provider, MD   memantine (NAMENDA) 10 mg tablet Take 5 mg by mouth 2 (two) times a day    Historical Provider, MD   nystatin (MYCOSTATIN) powder Apply 1 application topically 2 (two) times a day    Historical Provider, MD   potassium chloride (MICRO-K) 10 MEQ CR capsule Take 10 mEq by mouth 3 (three) times a day    Historical Provider, MD   sertraline (ZOLOFT) 50 mg tablet Take 75 mg by mouth daily  8/29/18  Historical Provider, MD   VALSARTAN PO Take 160 mg by mouth  8/29/18  Historical Provider, MD     I have reviewed home medications with patient family member      Allergies: No Known Allergies    Social History:  Marital Status: Single Occupation: unemployed  Patient Pre-hospital Living Situation: home  Patient Pre-hospital Level of Mobility: limited  Patient Pre-hospital Diet Restrictions: none  Substance Use History:     History   Alcohol Use No     History   Smoking Status    Never Smoker   Smokeless Tobacco    Never Used     History   Drug Use No       Family History:  I have reviewed the patients family history    Physical Exam:   Vitals:   Blood Pressure: 125/58 (08/29/18 1800)  Pulse: 74 (08/29/18 1815)  Temperature: (!) 97 3 °F (36 3 °C) (08/29/18 1546)  Temp Source: Tympanic (08/29/18 1546)  Respirations: 16 (08/29/18 1546)  SpO2: 96 % (08/29/18 1815)    Physical Exam   Constitutional: She appears well-developed  No distress  HENT:   Head: Normocephalic and atraumatic  Eyes: EOM are normal  Pupils are equal, round, and reactive to light  Neck: Normal range of motion  Neck supple  Cardiovascular: Normal rate, regular rhythm and normal heart sounds  Pulmonary/Chest: Effort normal and breath sounds normal  No respiratory distress  Abdominal: Soft  Bowel sounds are normal  She exhibits no distension  There is no tenderness  Musculoskeletal: She exhibits tenderness and deformity  She exhibits no edema  Neurological: She is alert  Nonverbal at baseline   Skin: Skin is warm  No erythema  Psychiatric:   Unable to assess   Nursing note and vitals reviewed  Additional Data:   Lab Results: I have personally reviewed pertinent reports          Results from last 7 days  Lab Units 08/29/18  1647   WBC Thousand/uL 11 40*   HEMOGLOBIN g/dL 11 0*   HEMATOCRIT % 33 5*   PLATELETS Thousands/uL 189   NEUTROS PCT % 86*   LYMPHS PCT % 8*   MONOS PCT % 6   EOS PCT % 0       Results from last 7 days  Lab Units 08/29/18  1647   SODIUM mmol/L 142   POTASSIUM mmol/L 3 4*   CHLORIDE mmol/L 106   CO2 mmol/L 28   BUN mg/dL 24   CREATININE mg/dL 1 16   CALCIUM mg/dL 8 9       Results from last 7 days  Lab Units 08/29/18  1648   INR  1 08 Imaging: I have personally reviewed pertinent reports  XR chest 1 view   Final Result by Interface, Radiology Results In (08/29 1727)   Normal chest evaluation for age 80 years  The lungs are clear  The chest is stable in interval since October 2016              Signed by Kailash Piper MD      XR hip/pelv 2-3 vws left if performed   Final Result by Evelin Banegas (08/29 1725)   No acute bony injury seen to the left hip  Acute fractures of the right superior and inferior pubic rami, adverse   change from prior  Signed by Evelin Banegas MD      CT spine cervical without contrast   Final Result by Andrew Saleem (08/29 1647)   No acute intracranial hemorrhage or fracture line is seen  There is a small subgaleal hematoma in the right frontal region  No acute fracture or traumatic subluxation is seen  Mild degenerative changes are again seen in the cervical spine  Signed by Jaja Farias MD      CT head without contrast   Final Result by Andrew Saleem (08/29 1647)   No acute intracranial hemorrhage or fracture line is seen  There is a small subgaleal hematoma in the right frontal region  No acute fracture or traumatic subluxation is seen  Mild degenerative changes are again seen in the cervical spine  Signed by Jaja Farias MD          EKG, Pathology, and Other Studies Reviewed on Admission:   · EKG: n/a    NetAccess/Epic Records Reviewed: Yes          ** Please Note: This note has been constructed using a voice recognition system   **

## 2018-08-29 NOTE — ASSESSMENT & PLAN NOTE
· Patient noted to be on the hypotensive side in the emergency department  · I will hold antihypertensives for now

## 2018-08-29 NOTE — ASSESSMENT & PLAN NOTE
· History of severe Alzheimer's dementia  · Continue Namenda  · Family are interested in nursing home placement, will discuss with case management

## 2018-08-29 NOTE — ED PROVIDER NOTES
History  Chief Complaint   Patient presents with    Fall     Pain to right hip, groin, right side of head     PATIENT HAS DEMENTIA AND LIVES AT A PERSONAL CARE HOME AND OFFERS A VERY LIMITED HISTORY  SHE APPARENTLY STOOD UP INDEPENDENTLY WITHOUT HER WALKER, AND FELL, WITH A VISIBLE CONTUSION ON HER RIGHT TEMPLE  BROUGHT TO THE ER VIA EMS IN A CERVICAL COLLAR  SHE INDICATES RIGHT HIP PAIN ON EXAM, BUT CANNOT OR DOES NOT VERBALLY INDICATED ANY OTHER COMPLAINTS  WHEN ASKED, SHE DOES NOT REPORT A HEADACHE, VISUAL CHANGE, NECK PAIN, OR UPPER EXTREMITY PAIN OR CHEST PAIN  Prior to Admission Medications   Prescriptions Last Dose Informant Patient Reported? Taking? Cholecalciferol (VITAMIN D HIGH POTENCY PO)   Yes No   Sig: Take 2,000 Units by mouth daily   amLODIPine (NORVASC) 5 mg tablet   Yes No   Sig: Take 10 mg by mouth daily     aspirin 81 mg chewable tablet   Yes No   Sig: Chew 81 mg daily   atorvastatin (LIPITOR) 10 mg tablet   Yes No   Sig: Take 10 mg by mouth daily   hydrochlorothiazide (MICROZIDE) 12 5 mg capsule   Yes No   Sig: Take 12 5 mg by mouth daily   losartan (COZAAR) 50 mg tablet   Yes No   Sig: Take 25 mg by mouth 2 (two) times a day   memantine (NAMENDA) 10 mg tablet   Yes No   Sig: Take 5 mg by mouth 2 (two) times a day   nystatin (MYCOSTATIN) powder   Yes No   Sig: Apply 1 application topically 2 (two) times a day   potassium chloride (MICRO-K) 10 MEQ CR capsule   Yes No   Sig: Take 10 mEq by mouth 3 (three) times a day      Facility-Administered Medications: None       Past Medical History:   Diagnosis Date    Dementia     Depression     Hypertension        History reviewed  No pertinent surgical history  History reviewed  No pertinent family history  I have reviewed and agree with the history as documented      Social History   Substance Use Topics    Smoking status: Never Smoker    Smokeless tobacco: Never Used    Alcohol use No        Review of Systems   Unable to perform ROS: Dementia   Constitutional: Negative for chills and fever  Physical Exam  Physical Exam   Constitutional: She appears well-developed and well-nourished  HENT:   Head: Normocephalic and atraumatic  Eyes: Conjunctivae and EOM are normal    Neck:   WITH IN RIGID COLLAR, NO PALPABLE SPINOUS TENDERNESS  EXCESS LORDOSIS   Cardiovascular: Normal rate, regular rhythm and normal heart sounds  No murmur heard  Pulmonary/Chest: Effort normal and breath sounds normal  She exhibits no tenderness  NONTENDER CHEST WALL AND CLAVICLES   Abdominal: Soft  Bowel sounds are normal  She exhibits no distension  There is no tenderness  Musculoskeletal:   NON ROTATED BUT SLIGHTLY SHORTENED RIGHT LOWER EXTREMITY  TENDERNESS OF PALPATION OF THE RIGHT GREATER TROCHANTER AREA AND PUBIC SYMPHYSIS   Neurological: No cranial nerve deficit  ORIENTED TO NAME ONLY  MINIMAL VERBAL RESPONSIVENESS  Skin: Skin is warm and dry  Capillary refill takes less than 2 seconds  No erythema         Vital Signs  ED Triage Vitals [08/29/18 1546]   Temperature Pulse Respirations Blood Pressure SpO2   (!) 97 3 °F (36 3 °C) 74 16 146/66 94 %      Temp Source Heart Rate Source Patient Position - Orthostatic VS BP Location FiO2 (%)   Tympanic Monitor Lying Left arm --      Pain Score       --           Vitals:    08/29/18 1546 08/29/18 1730 08/29/18 1800 08/29/18 1815   BP: 146/66 (!) 88/51 125/58    Pulse: 74 78 76 74   Patient Position - Orthostatic VS: Lying          Visual Acuity      ED Medications  Medications    EMS REPLENISHMENT MED (not administered)   fentanyl citrate (PF) 100 MCG/2ML 25 mcg (25 mcg Intravenous Given 8/29/18 1732)       Diagnostic Studies  Results Reviewed     Procedure Component Value Units Date/Time    Basic metabolic panel [[id number]]  (Abnormal) Collected:  08/29/18 1647    Lab Status:  Final result Specimen:  Blood from Arm, Left Updated:  08/29/18 1721     Sodium 142 mmol/L      Potassium 3 4 (L) mmol/L Chloride 106 mmol/L      CO2 28 mmol/L      ANION GAP 8 mmol/L      BUN 24 mg/dL      Creatinine 1 16 mg/dL      Glucose 131 (H) mg/dL      Calcium 8 9 mg/dL      eGFR 43 ml/min/1 73sq m     Narrative:         National Kidney Disease Education Program recommendations are as follows:  GFR calculation is accurate only with a steady state creatinine  Chronic Kidney disease less than 60 ml/min/1 73 sq  meters  Kidney failure less than 15 ml/min/1 73 sq  meters  Protime-INR [[id number]]  (Normal) Collected:  08/29/18 1648    Lab Status:  Final result Specimen:  Blood from Arm, Left Updated:  08/29/18 1709     Protime 12 6 seconds      INR 1 08    APTT [[id number]]  (Normal) Collected:  08/29/18 1648    Lab Status:  Final result Specimen:  Blood from Arm, Left Updated:  08/29/18 1709     PTT 26 seconds     CBC and differential [[id number]]  (Abnormal) Collected:  08/29/18 1647    Lab Status:  Final result Specimen:  Blood from Arm, Left Updated:  08/29/18 1655     WBC 11 40 (H) Thousand/uL      RBC 3 66 (L) Million/uL      Hemoglobin 11 0 (L) g/dL      Hematocrit 33 5 (L) %      MCV 92 fL      MCH 30 0 pg      MCHC 32 8 g/dL      RDW 13 8 %      MPV 8 7 fL      Platelets 965 Thousands/uL      nRBC 0 /100 WBCs      Neutrophils Relative 86 (H) %      Lymphocytes Relative 8 (L) %      Monocytes Relative 6 %      Eosinophils Relative 0 %      Basophils Relative 0 %      Neutrophils Absolute 9 80 (H) Thousands/µL      Lymphocytes Absolute 0 90 Thousands/µL      Monocytes Absolute 0 70 Thousand/µL      Eosinophils Absolute 0 00 Thousand/µL      Basophils Absolute 0 00 Thousands/µL                  XR chest 1 view   Final Result by Interface, Radiology Results In (08/29 1727)   Normal chest evaluation for age 80 years  The lungs are clear  The chest is stable in interval since October 2016                    Signed by Chun Freed MD      XR hip/pelv 2-3 vws left if performed   Final Result by Nikolas Hein (08/29 1725)   No acute bony injury seen to the left hip  Acute fractures of the right superior and inferior pubic rami, adverse   change from prior  Signed by Alisia Burks MD      CT spine cervical without contrast   Final Result by Andrew Saleem (08/29 1647)   No acute intracranial hemorrhage or fracture line is seen  There is a small subgaleal hematoma in the right frontal region  No acute fracture or traumatic subluxation is seen  Mild degenerative changes are again seen in the cervical spine  Signed by Silvestre Palma MD      CT head without contrast   Final Result by Andrew Saleem (08/29 1647)   No acute intracranial hemorrhage or fracture line is seen  There is a small subgaleal hematoma in the right frontal region  No acute fracture or traumatic subluxation is seen  Mild degenerative changes are again seen in the cervical spine  Signed by Silvestre Palma MD                 Procedures  Procedures       Phone Contacts  ED Phone Contact    ED Course    PATIENT UNDERWENT CT SCAN OF THE HEAD AND C-SPINE  NO INTRACRANIAL HEMORRHAGE  NO BONY INJURY  X-RAY FILMS OF THE HIPS AND PELVIS WERE REVIEWED, INFERIOR AND SUPERIOR PUBIC RAMI FRACTURE EVIDENT, NO FEMORAL NECK FRACTURES  LABORATORY STUDIES DID NOT SHOW ANY ACUTE SIGNIFICANT ABNORMALITIES  CARDIAC MONITOR SHOWED SINUS RHYTHM AT A NORMAL RATE                    [facility]  CritCare Time    Disposition  Final diagnoses:   Closed fracture of right inferior pubic ramus, initial encounter ([facility] )   Closed fracture of right superior pubic ramus, initial encounter ([facility] )     Time reflects when diagnosis was documented in both MDM as applicable and the Disposition within this note     Time User Action Codes Description Comment    8/29/2018  5:58 PM Fredirick Sensor Add [S32 591A] Closed fracture of right inferior pubic ramus, initial encounter ([facility] )     8/29/2018  5:59 PM Fredirick Sensor Add [S32 511A] Closed fracture of right superior pubic ramus, initial encounter [facility])       ED Disposition     ED Disposition Condition Comment    Admit  Case was discussed with TREE and the patient's admission status was agreed to be Admission Status: inpatient status to the service of Dr LEAVITT   Follow-up Information    None         Current Discharge Medication List      CONTINUE these medications which have NOT CHANGED    Details   amLODIPine (NORVASC) 5 mg tablet Take 10 mg by mouth daily        aspirin 81 mg chewable tablet Chew 81 mg daily      atorvastatin (LIPITOR) 10 mg tablet Take 10 mg by mouth daily      Cholecalciferol (VITAMIN D HIGH POTENCY PO) Take 2,000 Units by mouth daily      hydrochlorothiazide (MICROZIDE) 12 5 mg capsule Take 12 5 mg by mouth daily      losartan (COZAAR) 50 mg tablet Take 25 mg by mouth 2 (two) times a day      memantine (NAMENDA) 10 mg tablet Take 5 mg by mouth 2 (two) times a day      nystatin (MYCOSTATIN) powder Apply 1 application topically 2 (two) times a day      potassium chloride (MICRO-K) 10 MEQ CR capsule Take 10 mEq by mouth 3 (three) times a day           No discharge procedures on file      ED Provider  Electronically Signed by           Po Donaldson DO  08/29/18 1939

## 2018-08-29 NOTE — ASSESSMENT & PLAN NOTE
· Consult Orthopedic surgery  · DVT prophylaxis with Lovenox  · PT/OT  · Pain control  · Fall precautions

## 2018-08-30 PROBLEM — G93.40 ENCEPHALOPATHY: Status: ACTIVE | Noted: 2018-01-01

## 2018-08-30 PROBLEM — I63.9 CVA (CEREBROVASCULAR ACCIDENT) (HCC): Status: ACTIVE | Noted: 2018-01-01

## 2018-08-30 PROBLEM — N17.9 AKI (ACUTE KIDNEY INJURY) (HCC): Status: ACTIVE | Noted: 2018-01-01

## 2018-08-30 PROBLEM — D64.9 ANEMIA: Status: ACTIVE | Noted: 2018-01-01

## 2018-08-30 NOTE — PHYSICAL THERAPY NOTE
Physical Therapy Cancellation Note  PT consult received, EMR reviewed  PT assessment will be held at this time due to medical complications impacting safety of mobilization  I will check patient's status at a later time      Dominga Leonardo, PT

## 2018-08-30 NOTE — PROGRESS NOTES
IV NS Bolus completed and BP rechecked and read 74/40, pt continues to sleep not opening eyes, but responds to painful stimulation  NP informed of low BP and AM HGB, a second order for IV bolus placed, will continue to monitor and inform NP of patient status

## 2018-08-30 NOTE — PROGRESS NOTES
Patient BP 81/62 NP informed and an order for IV NS bolus of 250 ml/hr placed, will continue to monitor

## 2018-08-30 NOTE — ASSESSMENT & PLAN NOTE
· Normochromic normocytic  Suspected to be secondary to chronic disease  · The patient has a drop in hemoglobin this morning from 11-8 9  Repeated after fluid bolus shows hemoglobin of 7 4  There is no overt active bleeding  Suspected the drop to have some component of hemodilution  · Will check stool for occult blood      · Will do serial H&H  · Will transfuse if Hgb drops below 7

## 2018-08-30 NOTE — ASSESSMENT & PLAN NOTE
· Patient was lethargic during examination this a m  a response to painful stimuli  After speaking to her daughter, this is worsen than her baseline  Initial CT head on admission did not show any acute process  · I have repeated CT head without contrast which shows development of large areas of subacute ischemic changes in the occipital parietal region bilaterally  · Will consult Neurology  Will check MRI of the brain/MRA of head and neck  · Patient did not received any aspirin this a m  because of risk of aspiration due to lethargy  Will give 1 dose of aspirin suppository  Will increase statin dose  Will place the patient on telemetry  Frequent neuro checks

## 2018-08-30 NOTE — ASSESSMENT & PLAN NOTE
· Patient noted to be on the hypotensive side in the emergency department  · I will hold antihypertensives for now  · Will give IVF boluses and monitor closely

## 2018-08-30 NOTE — CONSULTS
Consultation - Nephrology   Marion Altamirano 80 y o  female MRN: [id number]  Unit/Bed#: -01 Encounter: [id number]      A/P:  1  Acute kidney injury: may have element of volume depletion  She was on ARB and HCTZ and was admitted with hypokalemia and renal failure (unknown prior stage)  Her kidney function has worsened today  Will check urine electrolytes and kidney ultrasound  Agree with normal saline at 75 ml/hour, however, watch serum sodium  2  Hypokalemia: due to prior diuretic and possibly causing weakness and falls in a patient with dementia  She received supplement and is now at 5 2  3  Hypertension: stable  Would not restart HCTZ on discharge  4  Fractures of right pubic rami: conservative treatment       Thank you for allowing us to participate in the care of your patient  Please feel free to contact us regarding the care of this patient, or any other questions/concerns that may be applicable  Patient Active Problem List   Diagnosis    Hypokalemia    Hyperlipidemia    Hypertension, essential    Dementia    Closed fracture of multiple pubic rami, right, [facility]       History of Present Illness   Physician Requesting Consult: Martita Stinson MD  Reason for Consult / Principal Problem: Acute kidney injury  Hx and PE limited by:   HPI: Marion Altamirano is a 80y o  year old female who presents with acute kidney injury  She has a history of chronic kidney disease stage IIIA  As she is a resident of a personal care home and fell fracturing her pubic rami on the right  She is has a history of dementia and is not responsive and cannot answer questions  She was admitted with a creatinine 1 1 but today her creatinine is 2 09 precipitating a renal consultation  She has a history of hypertension has been on hydrochlorothiazide and angiotensin blockers as an outpatient on these have been held    She is currently receiving normal saline at 75 mils per hour    History obtained from chart review and unobtainable from patient due to mental status    Review of Systems - Negative except as mentioned above in HPI, more specifics as mentioned below  Review of Systems - Negative except cannot be obtained secondary to the patient's dementia    Historical Information   Past Medical History:   Diagnosis Date    Dementia     Depression     Hypertension      History reviewed  No pertinent surgical history  Social History   History   Alcohol Use No     History   Drug Use No     History   Smoking Status    Never Smoker   Smokeless Tobacco    Never Used     History reviewed  No pertinent family history  She cannot provide due to mental status    Meds/Allergies   all current active meds have been reviewed, current meds: Current Facility-Administered Medications   Medication Dose Route Frequency    acetaminophen (TYLENOL) tablet 650 mg  650 mg Oral Q6H PRN    aspirin chewable tablet 81 mg  81 mg Oral Daily    atorvastatin (LIPITOR) tablet 10 mg  10 mg Oral Daily    enoxaparin (LOVENOX) subcutaneous injection 30 mg  30 mg Subcutaneous Q24H    memantine (NAMENDA) tablet 5 mg  5 mg Oral BID    morphine injection 1 mg  1 mg Intravenous Q4H PRN    ondansetron (ZOFRAN) injection 4 mg  4 mg Intravenous Q4H PRN    oxyCODONE-acetaminophen (PERCOCET) 5-325 mg per tablet 1 tablet  1 tablet Oral Q4H PRN    sodium chloride 0 9 % bolus 250 mL  250 mL Intravenous Once    sodium chloride 0 9 % infusion  75 mL/hr Intravenous Continuous    and PTA meds:  Prescriptions Prior to Admission   Medication    amLODIPine (NORVASC) 5 mg tablet    aspirin 81 mg chewable tablet    atorvastatin (LIPITOR) 10 mg tablet    Cholecalciferol (VITAMIN D HIGH POTENCY PO)    hydrochlorothiazide (MICROZIDE) 12 5 mg capsule    losartan (COZAAR) 50 mg tablet    memantine (NAMENDA) 10 mg tablet    nystatin (MYCOSTATIN) powder    potassium chloride (MICRO-K) 10 MEQ CR capsule         No Known Allergies    Objective Intake/Output Summary (Last 24 hours) at 08/30/18 0855  Last data filed at 08/29/18 1553   Gross per 24 hour   Intake              200 ml   Output                0 ml   Net              200 ml       Invasive Devices:   Urethral Catheter Straight-tip 16 Fr  (Active)   Site Assessment Skin intact 8/29/2018  8:30 PM   Collection Container Standard drainage bag 8/29/2018  8:30 PM   Securement Method Securing device (Describe) 8/29/2018  8:30 PM       Physical Exam      I/O last 3 completed shifts: In: 200 [I V :200]  Out: -     Vitals:    08/30/18 0655   BP: (!) 81/62   Pulse: 100   Resp: 16   Temp: 97 5 °F (36 4 °C)   SpO2: 96%       Gen: lethargic and not answering questions  Eyes closed  HEENT: eyes closed, MMM, neck supple  CV: +S1/S2, RRR  Lungs: CTA bilaterally  Abd: +BS, soft NT/ND  Ext: all four extremities are warm  Skin: no rashes noted  Neuro: could not assess due to mental status  Current Weight: Weight - Scale: 55 5 kg (122 lb 6 oz)  First Weight: Weight - Scale: 55 5 kg (122 lb 6 oz)    Lab Results:  I have personally reviewed pertinent labs      CBC: Lab Results   Component Value Date    WBC 8 90 08/30/2018    HGB 8 9 (L) 08/30/2018    HCT 26 4 (L) 08/30/2018    MCV 92 08/30/2018     08/30/2018    MCH 31 2 08/30/2018    MCHC 33 9 08/30/2018    RDW 14 2 08/30/2018    MPV 9 7 08/30/2018    NRBC 0 08/30/2018     CMP: Lab Results   Component Value Date     08/30/2018    K 5 2 08/30/2018     (H) 08/30/2018    CO2 26 08/30/2018    BUN 33 (H) 08/30/2018    CREATININE 2 07 (H) 08/30/2018    CALCIUM 8 7 08/30/2018    AST 13 08/30/2018    ALT 9 08/30/2018    ALKPHOS 48 (L) 08/30/2018    EGFR 21 08/30/2018     Phosphorus: No results found for: PHOS  Magnesium: No results found for: MG  Urinalysis: No results found for: COLORU, CLARITYU, SPECGRAV, PHUR, LEUKOCYTESUR, NITRITE, PROTEINUA, GLUCOSEU, KETONESU, BILIRUBINUR, BLOODU  Ionized Calcium: No results found for: GEOVANNI  Coagulation:   Lab Results   Component Value Date    INR 1 08 08/29/2018     Troponin: No results found for: TROPONINI  ABG: No results found for: PHART, YKK7NCM, PO2ART, ZOR4ZLN, Y7XAIGWX, BEART, SOURCE      Results from last 7 days  Lab Units 08/30/18  0434 08/29/18  1647   SODIUM mmol/L 143 142   POTASSIUM mmol/L 5 2 3 4*   CHLORIDE mmol/L 109* 106   CO2 mmol/L 26 28   BUN mg/dL 33* 24   CREATININE mg/dL 2 07* 1 16   CALCIUM mg/dL 8 7 8 9   ALK PHOS U/L 48*  --    ALT U/L 9  --    AST U/L 13  --        Radiology review:  Procedure: Xr Hip/pelv 2-3 Vws Left If Performed    Result Date: 8/29/2018  Narrative: INDICATION:  Left hip and pelvis pain post fall  ORDERING PROVIDER:  Hector Marsh  TECHNIQUE:  AP view of the pelvis and two view(s) of the left hip  COMPARISON:  June 13, 2018  FINDINGS: No acute displaced fracture is identified of the hip  Alignment appears anatomic  Osteoarthritis  No interval change in the appearance of the left hip when compared to prior  Surrounding soft tissues are unremarkable  Acute fractures of the right superior and inferior pubic rami, adverse change from prior  Impression: No acute bony injury seen to the left hip  Acute fractures of the right superior and inferior pubic rami, adverse change from prior  Signed by Kylah Greenfield MD    Procedure: Ct Head Without Contrast    Result Date: 8/29/2018  Narrative: INDICATION:  Trauma, fall  Ataxia  Right sided head pain  Denies neck pain  ORDERING PROVIDER:  Hector Marsh  TECHNIQUE:   CT of the brain was performed without contrast    CT of the cervical spine was performed without intravenous or intrathecal contrast  Sagittal and coronal reconstructions were generated  Automated mA/kV exposure control was utilized and patient examination was performed in strict accordance with principles of ALARA  RADIATION AMOUNT:  1381 7 mGy-cm  COMPARISON:  CT head 08/06/2018, CT head and cervical spine 06/13/2018   FINDINGS: BRAIN: There is no acute intracranial hemorrhage, midline shift, mass effect, or extra-axial fluid collection  Gray-white differentiation is preserved  Brain volume and ventricular system are within normal limits for age  Moderate patchy areas of low-attenuation are seen in the subcortical and deep periventricular white matter suggesting areas of chronic microvascular ischemia  There is a small subgaleal hematoma in the right frontal region  The skull base and calvarium are intact  The visualized paranasal sinuses are unremarkable  The visualized orbits, globes, and mastoid air cells are unremarkable  CERVICAL SPINE: The alignment is anatomic  There is no fracture or traumatic subluxation  The vertebral body heights are well maintained  Mild degenerative changes are seen in the cervical spine with endplate sclerosis, marginal osteophytes  There is a stable degenerative anterolisthesis of C6-C7, unchanged since previous examination  The paravertebral soft tissues are within normal limits  The visualized upper chest is unremarkable  Impression: No acute intracranial hemorrhage or fracture line is seen  There is a small subgaleal hematoma in the right frontal region  No acute fracture or traumatic subluxation is seen  Mild degenerative changes are again seen in the cervical spine  Signed by Yosi Johns MD    Procedure: Ct Spine Cervical Without Contrast    Result Date: 8/29/2018  Narrative: INDICATION:  Trauma, fall  Ataxia  Right sided head pain  Denies neck pain  ORDERING PROVIDER:  Gt Bee  TECHNIQUE:   CT of the brain was performed without contrast    CT of the cervical spine was performed without intravenous or intrathecal contrast  Sagittal and coronal reconstructions were generated  Automated mA/kV exposure control was utilized and patient examination was performed in strict accordance with principles of ALARA  RADIATION AMOUNT:  1381 7 mGy-cm   COMPARISON:  CT head 08/06/2018, CT head and cervical spine 06/13/2018  FINDINGS: BRAIN: There is no acute intracranial hemorrhage, midline shift, mass effect, or extra-axial fluid collection  Gray-white differentiation is preserved  Brain volume and ventricular system are within normal limits for age  Moderate patchy areas of low-attenuation are seen in the subcortical and deep periventricular white matter suggesting areas of chronic microvascular ischemia  There is a small subgaleal hematoma in the right frontal region  The skull base and calvarium are intact  The visualized paranasal sinuses are unremarkable  The visualized orbits, globes, and mastoid air cells are unremarkable  CERVICAL SPINE: The alignment is anatomic  There is no fracture or traumatic subluxation  The vertebral body heights are well maintained  Mild degenerative changes are seen in the cervical spine with endplate sclerosis, marginal osteophytes  There is a stable degenerative anterolisthesis of C6-C7, unchanged since previous examination  The paravertebral soft tissues are within normal limits  The visualized upper chest is unremarkable  Impression: No acute intracranial hemorrhage or fracture line is seen  There is a small subgaleal hematoma in the right frontal region  No acute fracture or traumatic subluxation is seen  Mild degenerative changes are again seen in the cervical spine  Signed by Jaja Farias MD    Procedure: Xr Chest 1 View    Result Date: 8/29/2018  Narrative: INDICATION:  status post fall  ORDERING PROVIDER:  Ziggy Fish  TECHNIQUE:  Frontal chest was obtained at 16:17 hours  COMPARISON:  10/8/16 XR  FINDINGS:  The cardiomediastinal silhouette is borderline, mildly enlarged, left ventricular hypertrophy, stable  The lungs are fully ventilated and clear without active infiltrate or pleurisy  There is no acute cardiovascular or pulmonary vascular abnormality  There is no pneumothorax  No acute osseous process        Impression: Normal chest evaluation for age 80 years  The lungs are clear  The chest is stable in interval since October 2016     Signed by Marah Bashir MD        EKG, Pathology, and Other Studies: reviewed      Counseling / Coordination of Care  Total ADDITIONAL floor / unit time spent today 35 minutes  Greater than 50% of total time was spent with the patient and / or family counseling and / or coordination of care   A description of the counseling / coordination of care: follows    Katey Murphy MD

## 2018-08-30 NOTE — PLAN OF CARE

## 2018-08-30 NOTE — SOCIAL WORK
Chart reviewed by case management, I met with Osmin wilder and he stated the pt needs snf and he asked for al ist , and it was given, I called [facility] and they stated that the pt is not able to return, the pt had a repeat ct scan and was seen by neuro, the pt had a stroke, hospice order was received and I met with Angi Andres and spoke with Preston Elizondo , they were given choices of hospice agencies and their choice was [facility] hospice, referral was made, if pt does not meet inpt hospice then she will need to go to a snf on hospice care  CM reviewed d/c planning process including the following: identifying help at home, patient preference for d/c planning needs, availability of treatment team to discuss questions or concerns patient and/or family may have regarding understanding medications and recognizing signs and symptoms once discharged  CM also encouraged patient to follow up with all recommended appointments after discharge  Patient advised of importance for patient and family to participate in managing patients medical well being

## 2018-08-30 NOTE — ASSESSMENT & PLAN NOTE
· This is likely secondary to subacute CVA  · Will continue treatment stated above  Pending Neurology evaluation  · Frequent neuro checks  Urinalysis does not appears to have any acute infection

## 2018-08-30 NOTE — PROGRESS NOTES
Third NS IV bolus completed and BP re checked and was 108/52, NP informed  CT scan ordered and completed  Patient continues to sleep and response to painful stimulation only, unable to follow commands  Will continue to monitor

## 2018-08-30 NOTE — ASSESSMENT & PLAN NOTE
· History of severe Alzheimer's dementia  · Continue Namenda  · Case management in process for placement

## 2018-08-30 NOTE — PROGRESS NOTES
Progress Note - Jeanine Marquez 1933, 80 y o  female MRN: [id number]    Unit/Bed#: -01 Encounter: [id number]    Primary Care Provider: HOLLY Mccloud   Date and time admitted to hospital: 8/29/2018  3:45 PM        * Closed fracture of multiple pubic rami, right, initial encounter [facility])   Assessment & Plan    · Orthopedics input is appreciated  · The patient has stable injury and no surgical intervention is recommended at this time  · Continue with PT/OT   · Toe-touch weight-bearing right lower extremity  · Patient will srinivas to have follow up with x-ray in approx 1 month to determine healing of fracture  CVA (cerebrovascular accident) [facility])   Assessment & Plan    · Patient was lethargic during examination this a m  a response to painful stimuli  After speaking to her daughter, this is worsen than her baseline  Initial CT head on admission did not show any acute process  · I have repeated CT head without contrast which shows development of large areas of subacute ischemic changes in the occipital parietal region bilaterally  · Will consult Neurology  Will check MRI of the brain/MRA of head and neck  · Patient did not received any aspirin this a m  because of risk of aspiration due to lethargy  Will give 1 dose of aspirin suppository  Will increase statin dose  Will place the patient on telemetry  Frequent neuro checks  Encephalopathy   Assessment & Plan    · This is likely secondary to subacute CVA  · Will continue treatment stated above  Pending Neurology evaluation  · Frequent neuro checks  Urinalysis does not appears to have any acute infection  Anemia   Assessment & Plan    · Normochromic normocytic  Suspected to be secondary to chronic disease  · The patient has a drop in hemoglobin this morning from 11-8 9  Repeated after fluid bolus shows hemoglobin of 7 4  There is no overt active bleeding   Suspected the drop to have some component of hemodilution  · Will check stool for occult blood  · Will do serial H&H  · Will transfuse if Hgb drops below 7        LUL (acute kidney injury) ([facility] [address] )   Assessment & Plan    · Suspected this to be secondary to volume depletion  Suspected to have underlining chronic renal failure with unknown stage  The patient was on ARB and HCTZ at home  · Renal input is appreciated  · Will continue with IV fluid  · Monitor renal function  Dementia   Assessment & Plan    · History of severe Alzheimer's dementia  · Continue Namenda  · Case management in process for placement        Hypertension, essential   Assessment & Plan    · Patient noted to be on the hypotensive side in the emergency department  · I will hold antihypertensives for now  · Will give IVF boluses and monitor closely         Hyperlipidemia   Assessment & Plan    · Continue statin        Hypokalemia   Assessment & Plan    · Replete and monitor           VTE Pharmacologic Prophylaxis: Pharmacologic: Enoxaparin (Lovenox)    Patient Centered Rounds: I have performed bedside rounds with nursing staff today  Discussions with Specialists or Other Care Team Provider:  Nursing, neurology   Education and Discussions with Family / Patient:  Daughter     Time Spent for Care: 30 minutes  More than 50% of total time spent on counseling and coordination of care as described above  Current Length of Stay: 1 day(s)    Current Patient Status: Inpatient   Certification Statement: The patient will continue to require additional inpatient hospital stay due to stroke     Discharge Plan: pending hospital course     Code Status: Level 3 - DNAR and DNI    Subjective:   No responding to question    Objective:     Vitals:   Temp (24hrs), Av 2 °F (36 2 °C), Min:96 8 °F (36 °C), Max:97 5 °F (36 4 °C)    HR:  [] 100  Resp:  [16-18] 16  BP: ()/(40-66) 108/52  SpO2:  [92 %-97 %] 96 %  Body mass index is 22 38 kg/m²       Input and Output Summary (last 24 hours): Intake/Output Summary (Last 24 hours) at 08/30/18 1331  Last data filed at 08/29/18 1553   Gross per 24 hour   Intake              200 ml   Output                0 ml   Net              200 ml       Physical Exam:     Physical Exam   Constitutional: She appears well-developed and well-nourished  She appears lethargic  HENT:   Head: Normocephalic and atraumatic  Mouth/Throat: Oropharynx is clear and moist    Eyes: Conjunctivae and EOM are normal  Pupils are equal, round, and reactive to light  Neck: Normal range of motion  Neck supple  Cardiovascular: Regular rhythm and normal heart sounds  Exam reveals no gallop and no friction rub  No murmur heard  Pulmonary/Chest: Effort normal and breath sounds normal  She has no wheezes  She has no rales  Abdominal: Soft  Bowel sounds are normal  She exhibits no distension  There is no tenderness  There is no rebound  Musculoskeletal: Normal range of motion  She exhibits no edema, tenderness or deformity  Neurological: She appears lethargic  GCS eye subscore is 2  GCS verbal subscore is 1  GCS motor subscore is 5  Patient is not following commands  Skin: Skin is warm and dry  No rash noted  No erythema  Vitals reviewed  Additional Data:     Labs:      Results from last 7 days  Lab Units 08/30/18  1127 08/30/18  0434   WBC Thousand/uL  --  8 90   HEMOGLOBIN g/dL 7 4* 8 9*   HEMATOCRIT % 21 9* 26 4*   PLATELETS Thousands/uL  --  174   NEUTROS PCT %  --  83*   LYMPHS PCT %  --  12*   MONOS PCT %  --  5   EOS PCT %  --  0       Results from last 7 days  Lab Units 08/30/18  0434   SODIUM mmol/L 143   POTASSIUM mmol/L 5 2   CHLORIDE mmol/L 109*   CO2 mmol/L 26   BUN mg/dL 33*   CREATININE mg/dL 2 07*   CALCIUM mg/dL 8 7   ALK PHOS U/L 48*   ALT U/L 9   AST U/L 13       Results from last 7 days  Lab Units 08/29/18  1648   INR  1 08               * I Have Reviewed All Lab Data Listed Above  * Additional Pertinent Lab Tests Reviewed:  All Labs For Current Hospital Admission  Reviewed    Imaging:  Imaging Reports Reviewed Today Include: CT head     Recent Cultures (last 7 days):           Last 24 Hours Medication List:     Current Facility-Administered Medications:  acetaminophen 650 mg Oral Q6H PRN Ade Mcghee MD   aspirin 81 mg Oral Daily Ade Mcghee MD   aspirin 300 mg Rectal Once HOLLY Rajan   [START ON 8/31/2018] atorvastatin 40 mg Oral Daily HOLLY Rajan   enoxaparin 30 mg Subcutaneous Q24H Ade Mcghee MD   memantine 5 mg Oral BID Ade Mcghee MD   morphine injection 1 mg Intravenous Q4H PRN Ade Mcghee MD   ondansetron 4 mg Intravenous Q4H PRN HOLLY Rajan   oxyCODONE-acetaminophen 1 tablet Oral Q4H PRN Ade Mcghee MD   sodium chloride 100 mL/hr Intravenous Continuous HOLLY Rajan        Today, Patient Was Seen By: HOLLY Rajan    ** Please Note: Dictation voice to text software may have been used in the creation of this document   **

## 2018-08-30 NOTE — PROGRESS NOTES
Second IV bolus completed and BP rechecked and remains low at 79/40, PT continues to sleep and responds to painful stimulation, NP informed and an order placed for NS IV bolus of 500ml/hr placed, will continue to monitor pt status and keep NP updated  Pan Jarrell

## 2018-08-30 NOTE — OCCUPATIONAL THERAPY NOTE
Approached pt approximately 0836 this am, unable to arouse pt, despite attempts, to successfully complete OT Eval   Will attempt eval at a later time    Veronica Branch, OT

## 2018-08-30 NOTE — ASSESSMENT & PLAN NOTE
· Suspected this to be secondary to volume depletion  Suspected to have underlining chronic renal failure with unknown stage  The patient was on ARB and HCTZ at home  · Renal input is appreciated  · Will continue with IV fluid  · Monitor renal function

## 2018-08-30 NOTE — CONSULTS
Consultation - Neurology    NAME@ 80 y o  female MRN: [id number]  Unit/Bed#: -01 Encounter: [id number]    Inpatient consult to Neurology  Consult performed by: Mila Mujica ordered by: Raghu Benson             Assessment/Plan   Assessment:  1  Bilateral ischemic stroke in the distribution of posterior and middle cerebral arteries  This can be embolic possibly cardioembolic in nature  She has more involvement of right hemisphere than left  The time of onset of his stroke is unclear  She is not a candidate for intravenous thrombolytic therapy due to unknown time of onset and history of advanced dementia  2   Advanced  Alzheimer's Dementia dementia  requiring significant assistance in ADL  3   Encephalopathy which is likely related to bilateral ischemic stroke  Plan:  I have discussed in detail with patient's son and her  about the findings of CT scan of the head  We also discussed the future prognosis  The chances of meaningful neurological recovery after having large bilateral ischemic stroke in a patient with advanced dementia is extremely low  At this point, her  and son like to place her on a comfort care and obtain a consultation with hospice  We will discontinue Namenda and continue to use aspirin per rectally  She will continue to have pain medication and IV fluids  She will be getting close hemodynamic and neurological monitoring  Although the family did declined the idea to consider any significant intervention  She can be treated with IV fluids to keep her systolic blood pressure in the range of 130s and 120s, until patient officially placed on hospice care  I thank you for allowing me to participate in the care of this patient  Please do not hesitate to contact with me, if you have any question or concern       History of Present Illness     Reason for Consult / Principal Problem:   Stroke  Hx and PE limited by:  Dementia    HPI: Vikas Partida is a 80 y o   female who presents after having a mechanical fall on August 29, 2018 afternoon  Patient was unable to give any information  The history is obtained from nursing staff, hospitalist, her son Kenn Quiñones and her  who were present at bedside  Patient has a history of advanced Alzheimer's dementia, and multiple mechanical falls in the past   Patient lives at a personal care facility and was brought into the emergency department after a mechanical fall  According to her son, at baseline she do not talk  She need assistant in eating/feeding, activities of daily living and has been incontinent of bladder  According to her son, last night, she was awake but unable to say words but with her gesture or head movement she was able to communicate about her pain  Her initial CT scan of the head emergency department was unremarkable  This morning, she found to be encephalopathic with decrease responsiveness  She had a repeat CT scan of the head which confirmed the presence of bilateral occipital and parietal ischemic strokes  On CT scan, this seems to be right more than left  Patient is not eating and unable to give any information  Historical Information   Past Medical History:   Diagnosis Date    Dementia     Depression     Hypertension      History reviewed  No pertinent surgical history  Social History   History   Alcohol Use No     History   Drug Use No     History   Smoking Status    Never Smoker   Smokeless Tobacco    Never Used     Family History:  Dementia    Review of pertinent previous medical records was done       Meds/Allergies   all current active meds have been reviewed, current meds:   Current Facility-Administered Medications   Medication Dose Route Frequency    acetaminophen (TYLENOL) tablet 650 mg  650 mg Oral Q6H PRN    aspirin chewable tablet 81 mg  81 mg Oral Daily    [START ON 8/31/2018] atorvastatin (LIPITOR) tablet 40 mg  40 mg Oral Daily    enoxaparin (LOVENOX) subcutaneous injection 30 mg  30 mg Subcutaneous Q24H    memantine (NAMENDA) tablet 5 mg  5 mg Oral BID    morphine injection 1 mg  1 mg Intravenous Q4H PRN    ondansetron (ZOFRAN) injection 4 mg  4 mg Intravenous Q4H PRN    oxyCODONE-acetaminophen (PERCOCET) 5-325 mg per tablet 1 tablet  1 tablet Oral Q4H PRN    sodium chloride 0 9 % infusion  100 mL/hr Intravenous Continuous    and PTA meds:   Prior to Admission Medications   Prescriptions Last Dose Informant Patient Reported? Taking? Cholecalciferol (VITAMIN D HIGH POTENCY PO)   Yes No   Sig: Take 2,000 Units by mouth daily   amLODIPine (NORVASC) 5 mg tablet   Yes No   Sig: Take 10 mg by mouth daily     aspirin 81 mg chewable tablet   Yes No   Sig: Chew 81 mg daily   atorvastatin (LIPITOR) 10 mg tablet   Yes No   Sig: Take 10 mg by mouth daily   hydrochlorothiazide (MICROZIDE) 12 5 mg capsule   Yes No   Sig: Take 12 5 mg by mouth daily   losartan (COZAAR) 50 mg tablet   Yes No   Sig: Take 25 mg by mouth 2 (two) times a day   memantine (NAMENDA) 10 mg tablet   Yes No   Sig: Take 5 mg by mouth 2 (two) times a day   nystatin (MYCOSTATIN) powder   Yes No   Sig: Apply 1 application topically 2 (two) times a day   potassium chloride (MICRO-K) 10 MEQ CR capsule   Yes No   Sig: Take 10 mEq by mouth 3 (three) times a day      Facility-Administered Medications: None       No Known Allergies    Objective   Vitals:Blood pressure 108/52, pulse 100, temperature 97 5 °F (36 4 °C), temperature source Tympanic, resp  rate 16, height 5' 2" (1 575 m), weight 55 5 kg (122 lb 6 oz), SpO2 96 %  ,Body mass index is 22 38 kg/m²  Intake/Output Summary (Last 24 hours) at 08/30/18 1513  Last data filed at 08/29/18 1553   Gross per 24 hour   Intake              200 ml   Output                0 ml   Net              200 ml       Invasive Devices:    Invasive Devices     Peripheral Intravenous Line            Peripheral IV 08/29/18 Left Antecubital less than 1 day Drain            Urethral Catheter Straight-tip 16 Fr  less than 1 day                Physical Exam     Patient is lying on bed without any apparent respiratory distress  Heart from was unremarkable  No carotid bruit  No ankle edema seen  Neurologic Exam     Patient found to be lethargic  She tried to open her eyes with painful stimulation but unable to completely open it or keep them open  She intermittently moan but did not communicate  Her pupil about 3 mm round and reactive to light  During painful stimulation, she tried to spontaneously move her eyes across midline  No gross facial weakness noted  She has wasting of distal group of muscles of extremities  She found to have minimal rest tremor of her upper extremities  She tried to withdraw her both upper extremities equally with painful stimulation  She minimally withdraws both feet with painful stimulation  Bilateral plantars were up going  The muscle stretch reflexes were depressed  The NIH stroke scale cannot be obtained due to her current condition      Lab Results:   CBC:   Results from last 7 days  Lab Units 08/30/18  1127 08/30/18  0434 08/29/18  1647   WBC Thousand/uL  --  8 90 11 40*   RBC Million/uL  --  2 87* 3 66*   HEMOGLOBIN g/dL 7 4* 8 9* 11 0*   HEMATOCRIT % 21 9* 26 4* 33 5*   MCV fL  --  92 92   PLATELETS Thousands/uL  --  174 189   , BMP/CMP:   Results from last 7 days  Lab Units 08/30/18  0434 08/29/18  1647   SODIUM mmol/L 143 142   POTASSIUM mmol/L 5 2 3 4*   CHLORIDE mmol/L 109* 106   CO2 mmol/L 26 28   BUN mg/dL 33* 24   CREATININE mg/dL 2 07* 1 16   CALCIUM mg/dL 8 7 8 9   AST U/L 13  --    ALT U/L 9  --    ALK PHOS U/L 48*  --    EGFR ml/min/1 73sq m 21 43   , Vitamin B12:   , HgBA1C:   , TSH:   Results from last 7 days  Lab Units 08/30/18  0434   TSH 3RD GENERATON uIU/mL 1 000   , Lipid Profile:   Results from last 7 days  Lab Units 08/30/18  0434   HDL mg/dL 33*   LDL CALC mg/dL 92   TRIGLYCERIDES mg/dL 133   , Urinalysis:   Results from last 7 days  Lab Units 08/30/18  1154   COLOR UA  Yellow   CLARITY UA  Clear   SPEC GRAV UA  1 020   PH UA  6 0   LEUKOCYTES UA  Negative   NITRITE UA  Negative   PROTEIN UA mg/dl 1+*   GLUCOSE UA mg/dl Negative   KETONES UA mg/dl Negative   BILIRUBIN UA  Negative   BLOOD UA  Trace-Intact*   , Drug Screen:     Imaging Studies: I have personally reviewed pertinent films in PACS  EKG, Pathology, and Other Studies: I have personally reviewed pertinent reports  VTE Prophylaxis: Enoxaparin (Lovenox)    CT head wo contrast     IMPRESSION:  Development of large areas of subacute ischemic changes in the occipital  parietal region bilaterally  Code Status: Level 3 - DNAR and DNI    Counseling / Coordination of Care      The total time spent was 70 minutes  More than 50% of this face-to-face encounter  spent in coordination of care and counseling as indicated below  I have reviewed physicians notes, radiology and lab data  I also spoke with nursing staff and hospitalist covering the case  I also spoke with the family member who was at the bedside

## 2018-08-30 NOTE — SOCIAL WORK
I spoke with the hospice nurse, information was sent as requested, she will come to see the pt tonight, I did met again with Ariana Roth and made her aware

## 2018-08-30 NOTE — PLAN OF CARE
Problem: DISCHARGE PLANNING - CARE MANAGEMENT  Goal: Discharge to post-acute care or home with appropriate resources  INTERVENTIONS:  - Conduct assessment to determine patient/family and health care team treatment goals, and need for post-acute services based on payer coverage, community resources, and patient preferences, and barriers to discharge  - Address psychosocial, clinical, and financial barriers to discharge as identified in assessment in conjunction with the patient/family and health care team  - Arrange appropriate level of post-acute services according to patient's   needs and preference and payer coverage in collaboration with the physician and health care team  - Communicate with and update the patient/family, physician, and health care team regarding progress on the discharge plan  - Arrange appropriate transportation to post-acute venues    Hospice care  Outcome: Progressing

## 2018-08-30 NOTE — OCCUPATIONAL THERAPY NOTE
OT Eval to be held at this time r/t current medical status, will assess at a later time    Bienvenido Croft OT

## 2018-08-30 NOTE — ASSESSMENT & PLAN NOTE
· Orthopedics input is appreciated  · The patient has stable injury and no surgical intervention is recommended at this time  · Continue with PT/OT   · Toe-touch weight-bearing right lower extremity  · Patient will srinivas to have follow up with x-ray in approx 1 month to determine healing of fracture

## 2018-08-30 NOTE — CASE MANAGEMENT
Initial Clinical Review    Admission: Date/Time/Statement: 8/29/18 @ 1801 INPATIENT    Orders Placed This Encounter   Procedures    Inpatient Admission (expected length of stay for this patient is greater than two midnights)     Standing Status:   Standing     Number of Occurrences:   1     Order Specific Question:   Admitting Physician     Answer:   Francisca Park [[id number]]     Order Specific Question:   Level of Care     Answer:   Med Surg [16]     Order Specific Question:   Estimated length of stay     Answer:   More than 2 Midnights     Order Specific Question:   Certification     Answer:   I certify that inpatient services are medically necessary for this patient for a duration of greater than two midnights  See H&P and MD Progress Notes for additional information about the patient's course of treatment  ED: Date/Time/Mode of Arrival:   ED Arrival Information     Expected Arrival Acuity Means of Arrival Escorted By Service Admission Type    - 8/29/2018 15:45 Urgent Ambulance [facility] General Medicine Urgent    Arrival Complaint    Fall        Chief Complaint:   Chief Complaint   Patient presents with    Fall     Pain to right hip, groin, right side of head       History of Illness:      Pepe Ram is a 80 y o  female who presents with a mechanical fall  Patient has a history of advanced Alzheimer's dementia, and multiple mechanical falls in the past   Patient lives at a personal care facility and was brought into the emergency department after a mechanical fall  She appeared to be in pain and imaging studies revealed multiple pubic rami fractures on the right  Due to the patient's advanced Alzheimer's dementia, she is unable to provide a history  Majority of the history is obtained from medical record,  and son who were both at bedside  Patient was given pain medications in the emergency department and her pain appears to be well controlled    Given that she has had numerous falls at her personal care facility, family are considering nursing home placement  ED Vital Signs:   ED Triage Vitals   Temperature Pulse Respirations Blood Pressure SpO2   08/29/18 1546 08/29/18 1546 08/29/18 1546 08/29/18 1546 08/29/18 1546   (!) 97 3 °F (36 3 °C) 74 16 146/66 94 %      Temp Source Heart Rate Source Patient Position - Orthostatic VS BP Location FiO2 (%)   08/29/18 1546 08/29/18 1546 08/29/18 1546 08/29/18 1546 --   Tympanic Monitor Lying Left arm       Pain Score       08/29/18 1900       7        Wt Readings from Last 1 Encounters:   08/29/18 55 5 kg (122 lb 6 oz)       Vital Signs (abnormal):     Date/Time  Temp  Pulse  Resp  BP  SpO2  O2 Device    08/30/18 1031  --  --  --   79/40  --  --    08/30/18 0950  --  --  --   74/40  --  --    08/30/18 0655  97 5 °F   100  16   81/62  96 %  None (Room air)    08/29/18 2222    87  16   86/50  92 %  None (Room air)    08/29/18 1730  --  78  --   88/51  97 %  --        Abnormal Labs/Diagnostic Test Results:     X-ray left hip/pelvis:     Acute fractures of the right superior and inferior pubic rami, adverse change from prior  No acute bony injury seen to the left hip  8/29 CT head: No acute intracranial hemorrhage or fracture line is seen  There is a small subgaleal hematoma in the right frontal region  No acute fracture or traumatic subluxation is seen  CT cervical spine: No acute intracranial hemorrhage or fracture line is seen  There is a small subgaleal hematoma in the right frontal region  No acute fracture or traumatic subluxation is seen        BUN/Creatinine = 33/2 07, Potassium = 3 4, Glucose = 131, WBC = 11 40, ANC = 9 8, H&H = 11 0/33 5, repeat H&H = 8 9/26 4, and 7 4/21 9    ED Treatment:   Medication Administration from 08/29/2018 1510 to 08/29/2018 1931       Date/Time Order Dose Route Action Action by Comments     08/29/2018 1732 fentanyl citrate (PF) 100 MCG/2ML 25 mcg 25 mcg Intravenous Given Cheng Johnna, RN           Past Medical/Surgical History: Active Ambulatory Problems     Diagnosis Date Noted    No Active Ambulatory Problems     Resolved Ambulatory Problems     Diagnosis Date Noted    No Resolved Ambulatory Problems     Past Medical History:   Diagnosis Date    Dementia     Depression     Hypertension        Admitting Diagnosis: Hip injury [S79 719A]  Closed fracture of right inferior pubic ramus, initial encounter ([facility] [address] ) [S32 591A]  Closed fracture of right superior pubic ramus, initial encounter ([facility] ) [S32 511A]    Age/Sex: 80 y o  female    Assessment/Plan:     * Closed fracture of multiple pubic rami, right, initial encounter ([facility] )   Assessment & Plan     · Consult Orthopedic surgery  · DVT prophylaxis with Lovenox  · PT/OT  · Pain control  · Fall precautions          Hypokalemia   Assessment & Plan     · Replete potassium          Hyperlipidemia   Assessment & Plan     · Continue statin          Hypertension, essential   Assessment & Plan     · Patient noted to be on the hypotensive side in the emergency department  · I will hold antihypertensives for now          Dementia   Assessment & Plan     · History of severe Alzheimer's dementia  · Continue Namenda  · Family are interested in nursing home placement, will discuss with case management                VTE Prophylaxis: Enoxaparin (Lovenox)  Code Status: DNR/DNI  POLST: POLST is not applicable to this patient  Discussion with family: and son at bedside     Anticipated Length of Stay:  Patient will be admitted on an Inpatient basis with an anticipated length of stay of  > 2 midnights  Justification for Hospital Stay: pubic rami fracture       Admission Orders: Nephrology and Orthopedic Surgery consults, telemetry monitoring, PT/OT eval and treat, urinary catheter, sequential compression device          Scheduled Meds:   Current Facility-Administered Medications:  acetaminophen 650 mg Oral Q6H PRN   aspirin 81 mg Oral Daily   atorvastatin 10 mg Oral Daily enoxaparin 30 mg Subcutaneous Q24H   memantine 5 mg Oral BID   morphine injection 1 mg Intravenous Q4H PRN   ondansetron 4 mg Intravenous Q4H PRN   oxyCODONE-acetaminophen 1 tablet Oral Q4H PRN     Continuous Infusions:   sodium chloride 75 mL/hr       ==========================================================  8/30 Orthopedic Consult:    Assessment:  Fracture right inferior and superior pubic rami fracture  Plan:  The patient has a stable injury needing no surgery  Out of bed  Toe-touch weight-bearing right lower extremity  PT/OT  DVT prophylaxis  Will need x-rays in approximately 1 month to determine healing of fracture    =============================================================  8/30 Nephrology Consult:    A/P:  1  Acute kidney injury: may have element of volume depletion  She was on ARB and HCTZ and was admitted with hypokalemia and renal failure (unknown prior stage)  Her kidney function has worsened today  Will check urine electrolytes and kidney ultrasound  Agree with normal saline at 75 ml/hour, however, watch serum sodium  2  Hypokalemia: due to prior diuretic and possibly causing weakness and falls in a patient with dementia  She received supplement and is now at 5 2  3  Hypertension: stable  Would not restart HCTZ on discharge  4   Fractures of right pubic rami: conservative treatment

## 2018-08-30 NOTE — CONSULTS
Consultation - Orthopedics   Jose L Munoz 80 y o  female MRN: [id number]  Unit/Bed#: -01 Encounter: [id number]      Assessment/Plan     Assessment:  Fracture right inferior and superior pubic rami fracture  Plan:  The patient has a stable injury needing no surgery  Out of bed  Toe-touch weight-bearing right lower extremity  PT/OT  DVT prophylaxis  Will need x-rays in approximately 1 month to determine healing of fracture    History of Present Illness   Physician Requesting Consult: Maggi Arias MD  Reason for Consult / Principal Problem:   Fractured pelvis  HPI: Jose L Munoz is a 80y o  year old female who presents from a personal care facility after sustaining a mechanical fall  The patient has a history of dementia  The history was obtained from the chart  There was no head injury  There is no loss of consciousness  There is no chest pain or shortness of breath  The patient came to the emergency room were x-rays were taken and showed a fracture of the right superior and inferior pubic rami    Consults    Review of Systems    Historical Information   Past Medical History:   Diagnosis Date    Dementia     Depression     Hypertension      History reviewed  No pertinent surgical history  Social History   History   Alcohol Use No     History   Drug Use No     History   Smoking Status    Never Smoker   Smokeless Tobacco    Never Used     Family History: non-contributory    Meds/Allergies   all current active meds have been reviewed  No Known Allergies    Objective   Vitals: Blood pressure (!) 81/62, pulse 100, temperature 97 5 °F (36 4 °C), temperature source Tympanic, resp  rate 16, height 5' 2" (1 575 m), weight 55 5 kg (122 lb 6 oz), SpO2 96 %  ,Body mass index is 22 38 kg/m²        Intake/Output Summary (Last 24 hours) at 08/30/18 4030  Last data filed at 08/29/18 5470   Gross per 24 hour   Intake              200 ml   Output                0 ml   Net              200 ml     I/O last 24 hours: In: 200 [I V :200]  Out: -     Invasive Devices     Peripheral Intravenous Line            Peripheral IV 08/29/18 Left Antecubital less than 1 day          Drain            Urethral Catheter Straight-tip 16 Fr  less than 1 day                Physical Exam  Ortho Exam         Her spine is midline  There is no tenderness  There is a negative straight leg and a negative contralateral straight leg raising test   There is no leg-length discrepancy  There is tenderness in the groin to deep palpation  There is tenderness to rotation along the hip  There is no trochanteric pain  Neurologically the patient is intact  Peripheral pulses are intact  There is good capillary refill  There is no pelvic instability  Lab Results: I have personally reviewed pertinent lab results  Imaging Studies: I have personally reviewed pertinent films in PACS  EKG, Pathology, and Other Studies: I have personally reviewed pertinent reports  VTE Prophylaxis: Sequential compression device Sinai Belcher     Code Status: Level 3 - DNAR and DNI  Advance Directive and Living Will:      Power of :    POLST:      Counseling / Coordination of Care  Total floor / unit time spent today 30 minutes  Greater than 50% of total time was spent with the patient and / or family counseling and / or coordination of care   A description of the counseling / coordination of care:

## 2018-08-31 PROBLEM — Z51.5 HOSPICE CARE: Status: ACTIVE | Noted: 2018-01-01

## 2018-08-31 NOTE — ASSESSMENT & PLAN NOTE
· History of severe Alzheimer's dementia  · Namenda discontinued   · Will be placed on hospice care

## 2018-08-31 NOTE — ASSESSMENT & PLAN NOTE
· Orthopedics input is appreciated  · The patient has stable injury and no surgical intervention is recommended at this time  · Due to CVA found on CT head on 8/30, family now wishes patient on hospice     · Discontinue PT/OT

## 2018-08-31 NOTE — ASSESSMENT & PLAN NOTE
· Suspected this to be secondary to volume depletion  Suspected to have underlining chronic renal failure with unknown stage  The patient was on ARB and HCTZ at home  · Renal input is appreciated  · Will continue with IV fluid for now until hospice evaluation is final   · The patient will be comfort measure only

## 2018-08-31 NOTE — ASSESSMENT & PLAN NOTE
· Bilateral ischemic stroke in the distribution of posterior and middle cerebral arteries  · Neurology input is appreciated  The time of onset of the stroke is unclear and the patient is not a candidate for IV thrombolytic therapy  · Both neurologist and myself discussed this in detail with the patient's son and her  regarding to findings and future prognosis  They wish is to place the patient on comfort measure only and obtain hospice consult  · I am waiting to discuss with hospice team for further plan  · At this time will discontinue oral medications as the patient is unresponsive  Keep IV fluid at this time  Continue with aspirin suppository, Lovenox and Tylenol and morphine

## 2018-08-31 NOTE — DISCHARGE SUMMARY
Discharge- Marion Borne 1933, 80 y o  female MRN: [id number]    Unit/Bed#: -01 Encounter: [id number]    Primary Care Provider: HOLLY Palomares   Date and time admitted to hospital: 8/29/2018  3:45 PM        * Closed fracture of multiple pubic rami, right, initial encounter [facility])   Assessment & Plan    · Orthopedics input is appreciated  · The patient has stable injury and no surgical intervention is recommended at this time  · Due to CVA found on CT head on 8/30, family now wishes patient on hospice  · Discontinue PT/OT         CVA (cerebrovascular accident) [facility])   Assessment & Plan    · Bilateral ischemic stroke in the distribution of posterior and middle cerebral arteries  · Neurology input is appreciated  The time of onset of the stroke is unclear and the patient is not a candidate for IV thrombolytic therapy  · Both neurologist and myself discussed this in detail with the patient's son and her  regarding to findings and future prognosis  They wish is to place the patient on comfort measure only and obtain hospice consult  · I am waiting to discuss with hospice team for further plan  · At this time will discontinue oral medications as the patient is unresponsive  Keep IV fluid at this time  Continue with aspirin suppository, Lovenox and Tylenol and morphine  Encephalopathy   Assessment & Plan    Secondary to CVA  Treatment as stated above  Anemia   Assessment & Plan    · Normochromic normocytic  · Dropped in [facility] this AM  Suspected 2/2 intracranial bleeding  · Patient will be placed on hospice  No transfusion at this time  LUL (acute kidney injury) ([facility] [address] )   Assessment & Plan    · Suspected this to be secondary to volume depletion  Suspected to have underlining chronic renal failure with unknown stage  The patient was on ARB and HCTZ at home  · Renal input is appreciated    · Will continue with IV fluid for now until hospice evaluation is final   · The patient will be comfort measure only  Dementia   Assessment & Plan    · History of severe Alzheimer's dementia  · Namenda discontinued   · Will be placed on hospice care  Hypertension, essential   Assessment & Plan    · Will be placed on hospice care  Hyperlipidemia   Assessment & Plan    · Will be placed on hospice care  Hypokalemia   Assessment & Plan    · Will be placed on hospice care  Discharging Physician / Practitioner: Marita Maxwell  PCP: Marita Alcala  Admission Date:   Admission Orders     Ordered        08/29/18 1801  Inpatient Admission (expected length of stay for this patient is greater than two midnights)  Once             Discharge Date: 08/31/18    Disposition:      Other: IP hospice     For Discharges to [address] [facility]:   · Not Applicable to this Patient - Not Applicable to this Patient    Reason for Admission: Fall     Discharge Diagnoses:     Please see assessment and plan section above for further details regarding discharge diagnoses  Resolved Problems  Date Reviewed: 8/31/2018    None            Consultations During Hospital Stay:  · Nephrology  · Neurology  · Orthopedics    Procedures Performed:   · None     Medication Adjustments and Discharge Medications:  · Summary of Medication Adjustments made as a result of this hospitalization: none   · Medication Dosing Tapers - Please refer to Discharge Medication List for details on any medication dosing tapers (if applicable to patient)  · Medications being temporarily held (include recommended restart time): none   · Discharge Medication List: See after visit summary for reconciled discharge medications  Wound Care Recommendations:  When applicable, please see wound care section of After Visit Summary      Diet Recommendations at Discharge:   Diet -        Diet Orders            Start     Ordered    08/30/18 1354  Diet NPO; Sips with meds Diet effective now     Question Answer Comment   Diet Type NPO    NPO Except: Sips with meds    RD to adjust diet per protocol? Yes        08/30/18 1353        Fluid Restriction - No Fluid Restriction at Discharge  Significant Findings / Test Results:   · CT of head on 08/30/2018 shows development of large areas of subacute ischemic changes in the occipital parietal region bilaterally  · X-ray of hip on 08/29/2018 shows acute fracture of the right superior and inferior pubic rami  · CT head on 09/29/2018 shows no acute intracranial hemorrhage or fracture line  There is small subgaleal hematoma on the right frontal region  Mild degenerative changes in the cervical spine    Incidental Findings:   · subacute CVA      Test Results Pending at Discharge (will require follow up): · None      Outpatient Tests Requested:  · None     Complications:    · Subacute CVA     Hospital Course:     Genet Gautam is a 80 y o  female patient who originally presented to the hospital on 8/29/2018 due to mechanical fall  The patient has history of advanced Alzheimer's dementia and history of multiple mechanical falls in the past    She was brought in after she had a fall and was complaining of hip pain  She was found to have stable pubic fracture noted above  Orthopedics does not recommend any surgical intervention at this time  Initial CT head on admission did not show any acute process  However, the morning of 2nd IP day, the patient had decreased responsiveness with hypotension  IV fluid boluses were given with slight improvement blood pressure  She was also noted to have drop in hemoglobin  CT scan of the head was repeated and the result shows large areas of subacute ischemic changes in the occipital parietal region bilaterally  Subsequently Neurology consult was obtained  Both neurologist and myself,   had a discussion with the patient's family about the finding and the future prognosis     The patient's  and son agreed for comfort measure only and would like to consult hospice  Subsequently the patient will be discharged to hospice service  Condition at Discharge: hospice      Discharge Day Visit / Exam:     * Please refer to separate progress note for these details *    Goals of Care Discussions:  · Code Status at Discharge: Level 4 - Comfort Care  · Were there any Goals of Care Discussions during Hospitalization?: Yes  · Results of any General Goals of Care Discussions: same        Discharge instructions/Information to patient and family:   See after visit summary section titled Discharge Instructions for information provided to patient and family  Planned Readmission: no       Discharge Statement:  I spent 45 minutes discharging the patient  This time was spent on the day of discharge  I had direct contact with the patient on the day of discharge  Greater than 50% of the total time was spent examining patient, answering all patient questions, arranging and discussing plan of care with patient as well as directly providing post-discharge instructions  Additional time then spent on discharge activities      ** Please Note: This note has been constructed using a voice recognition system **

## 2018-08-31 NOTE — H&P
H&P- Cindy Baker 1933, 80 y o  female MRN: [id number]    Unit/Bed#: -01 Encounter: [id number]    Primary Care Provider: HOLLY Oh   Date and time admitted to hospital: 8/31/2018  1:17 PM        * Hospice care   Assessment & Plan    · Continue supportive care  · Morphine for pain and Ativan/haldol for anxiety          VTE Prophylaxis: Pharmacologic VTE Prophylaxis contraindicated due to Comfort measure only  Code Status:   Comfort care  POLST: POLST form is on file already (pre-hospital)  Discussion with family:  and son     Anticipated Length of Stay:  Patient will be admitted on an Inpatient basis with an anticipated length of stay of  > 2 midnights  Justification for Hospital Stay:   Inpatient hospice    Total Time for Visit, including Counseling / Coordination of Care: 20 minutes  Greater than 50% of this total time spent on direct patient counseling and coordination of care  Chief Complaint:     Inpatient hospice    History of Present Illness:    Cindy Baker is a 80 y o  female who presents with mechanical fall  The patient was found to have pubis fracture  On the 2nd day of inpatient stay, patient was found to have bilateral ischemic stroke in the distribution of posterior and middle cerebral artery  The time is onset is unclear  After discussion with Neurology and Internal Medicine team,  and son wishes the patient to go on a comfort measure only and consultation with hospice  Review of Systems:  Review of Systems   Unable to perform ROS: Patient unresponsive       Past Medical and Surgical History:   Past Medical History:   Diagnosis Date    Dementia     Depression     Hypertension        No past surgical history on file  Meds/Allergies:  Prior to Admission medications    Medication Sig Start Date End Date Taking?  Authorizing Provider   amLODIPine (NORVASC) 5 mg tablet Take 10 mg by mouth daily      Historical Provider, MD   aspirin 81 mg chewable tablet Chew 81 mg daily    Historical Provider, MD   atorvastatin (LIPITOR) 10 mg tablet Take 10 mg by mouth daily    Historical Provider, MD   Cholecalciferol (VITAMIN D HIGH POTENCY PO) Take 2,000 Units by mouth daily    Historical Provider, MD   hydrochlorothiazide (MICROZIDE) 12 5 mg capsule Take 12 5 mg by mouth daily    Historical Provider, MD   losartan (COZAAR) 50 mg tablet Take 25 mg by mouth 2 (two) times a day    Historical Provider, MD   memantine (NAMENDA) 10 mg tablet Take 5 mg by mouth 2 (two) times a day    Historical Provider, MD   nystatin (MYCOSTATIN) powder Apply 1 application topically 2 (two) times a day    Historical Provider, MD   potassium chloride (MICRO-K) 10 MEQ CR capsule Take 10 mEq by mouth 3 (three) times a day    Historical Provider, MD     hospice     Allergies: No Known Allergies    Social History:  Marital Status:    Substance Use History:     History   Alcohol Use No     History   Smoking Status    Never Smoker   Smokeless Tobacco    Never Used     History   Drug Use No       Family History:  I have reviewed the patients family history    Physical Exam:   Vitals:   Blood Pressure: 120/64 (08/31/18 1510)  Pulse: 102 (08/31/18 1510)  Temperature: 98 8 °F (37 1 °C) (08/31/18 1510)  Temp Source: Tympanic (08/31/18 1510)  Respirations: 16 (08/31/18 1510)  SpO2: 91 % (08/31/18 1510)    Physical Exam   Constitutional: She appears well-developed  No distress  HENT:   Head: Normocephalic and atraumatic  Mouth/Throat: Oropharynx is clear and moist    Eyes: Conjunctivae are normal  Pupils are equal, round, and reactive to light  Neck: No thyromegaly present  Cardiovascular: Normal rate, regular rhythm and normal heart sounds  Exam reveals no gallop and no friction rub  No murmur heard  Pulmonary/Chest: Effort normal  She has no wheezes  She has no rales  Very decreased     Abdominal: Soft   Bowel sounds are normal  She exhibits no distension and no mass    Musculoskeletal: She exhibits no edema or deformity  Neurological: She is unresponsive  Skin: Skin is warm and dry  No rash noted  No erythema  Vitals reviewed  Additional Data:   Lab Results: I have personally reviewed pertinent reports  Results from last 7 days  Lab Units 08/31/18  0515  08/30/18  0434   WBC Thousand/uL 8 90  --  8 90   HEMOGLOBIN g/dL 6 2*  < > 8 9*   HEMATOCRIT % 18 2*  < > 26 4*   PLATELETS Thousands/uL 128*  --  174   NEUTROS PCT %  --   --  83*   LYMPHS PCT %  --   --  12*   MONOS PCT %  --   --  5   EOS PCT %  --   --  0   < > = values in this interval not displayed  Results from last 7 days  Lab Units 08/31/18  0515 08/30/18  0434   SODIUM mmol/L 146* 143   POTASSIUM mmol/L 4 8 5 2   CHLORIDE mmol/L 116* 109*   CO2 mmol/L 22 26   BUN mg/dL 47* 33*   CREATININE mg/dL 3 02* 2 07*   CALCIUM mg/dL 7 7* 8 7   ALK PHOS U/L  --  48*   ALT U/L  --  9   AST U/L  --  13       Results from last 7 days  Lab Units 08/29/18  1648   INR  1 08               Imaging: I have personally reviewed pertinent reports  No orders to display       [employer]/Epic Records Reviewed: Yes     ** Please Note: This note has been constructed using a voice recognition system   **

## 2018-08-31 NOTE — PROGRESS NOTES
Progress Note - Claven Curb 1933, 80 y o  female MRN: [id number]    Unit/Bed#: -01 Encounter: [id number]    Primary Care Provider: HOLLY Orozco   Date and time admitted to hospital: 8/29/2018  3:45 PM        * Closed fracture of multiple pubic rami, right, initial encounter [facility])   Assessment & Plan    · Orthopedics input is appreciated  · The patient has stable injury and no surgical intervention is recommended at this time  · Due to CVA found on CT head on 8/30, family now wishes patient on hospice  · Discontinue PT/OT         CVA (cerebrovascular accident) [facility])   Assessment & Plan    · Bilateral ischemic stroke in the distribution of posterior and middle cerebral arteries  · Neurology input is appreciated  The time of onset of the stroke is unclear and the patient is not a candidate for IV thrombolytic therapy  · Both neurologist and myself discussed this in detail with the patient's son and her  regarding to findings and future prognosis  They wish is to place the patient on comfort measure only and obtain hospice consult  · I am waiting to discuss with hospice team for further plan  · At this time will discontinue oral medications as the patient is unresponsive  Keep IV fluid at this time  Continue with aspirin suppository, Lovenox and Tylenol and morphine  Encephalopathy   Assessment & Plan    Secondary to CVA  Treatment as stated above  Anemia   Assessment & Plan    · Normochromic normocytic  · Dropped in [facility] this AM  Suspected 2/2 intracranial bleeding  · Patient will be placed on hospice  No transfusion at this time  LUL (acute kidney injury) ([facility] [address] )   Assessment & Plan    · Suspected this to be secondary to volume depletion  Suspected to have underlining chronic renal failure with unknown stage  The patient was on ARB and HCTZ at home  · Renal input is appreciated    · Will continue with IV fluid for now until hospice evaluation is final   · The patient will be comfort measure only  Dementia   Assessment & Plan    · History of severe Alzheimer's dementia  · Namenda discontinued   · Will be placed on hospice care  Hypertension, essential   Assessment & Plan    · Will be placed on hospice care  Hyperlipidemia   Assessment & Plan    · Will be placed on hospice care  Hypokalemia   Assessment & Plan    · Will be placed on hospice care  VTE Pharmacologic Prophylaxis: Pharmacologic: Enoxaparin (Lovenox)    Patient Centered Rounds: I have performed bedside rounds with nursing staff today  Discussions with Specialists or Other Care Team Provider: nursing, neurology, hospice   Education and Discussions with Family / Patient:  Son and      Time Spent for Care: 20 minutes  More than 50% of total time spent on counseling and coordination of care as described above  Current Length of Stay: 2 day(s)    Current Patient Status: Inpatient   Certification Statement: The patient will continue to require additional inpatient hospital stay due to discharge placement     Discharge Plan:  Pending dispo    Code Status: Level 3 - DNAR and DNI    Subjective:   Non-responsive     Objective:     Vitals:   Temp (24hrs), Av 4 °F (36 9 °C), Min:97 6 °F (36 4 °C), Max:99 9 °F (37 7 °C)    HR:  [] 85  Resp:  [16-18] 18  BP: (108-146)/(52-68) 146/61  SpO2:  [93 %-98 %] 93 %  Body mass index is 22 38 kg/m²  Input and Output Summary (last 24 hours): Intake/Output Summary (Last 24 hours) at 18 1113  Last data filed at 18 1024   Gross per 24 hour   Intake             1440 ml   Output               50 ml   Net             1390 ml       Physical Exam:     Physical Exam   Constitutional: She appears well-developed  No distress  HENT:   Head: Normocephalic and atraumatic  Mouth/Throat: Oropharynx is clear and moist    Eyes: Conjunctivae are normal  Right eye exhibits no discharge   Left eye exhibits no discharge  Neck: Neck supple  No thyromegaly present  Cardiovascular: Normal rate, regular rhythm and normal heart sounds  Pulmonary/Chest: She has no wheezes  She has no rales  Decreased     Abdominal: Soft  She exhibits no distension  There is no rebound  Hypoactive      Musculoskeletal: She exhibits no edema  Neurological: No cranial nerve deficit  Skin: Skin is dry  No rash noted  No erythema  Vitals reviewed  Additional Data:     Labs:      Results from last 7 days  Lab Units 08/31/18  0515  08/30/18  0434   WBC Thousand/uL 8 90  --  8 90   HEMOGLOBIN g/dL 6 2*  < > 8 9*   HEMATOCRIT % 18 2*  < > 26 4*   PLATELETS Thousands/uL 128*  --  174   NEUTROS PCT %  --   --  83*   LYMPHS PCT %  --   --  12*   MONOS PCT %  --   --  5   EOS PCT %  --   --  0   < > = values in this interval not displayed  Results from last 7 days  Lab Units 08/31/18  0515 08/30/18  0434   SODIUM mmol/L 146* 143   POTASSIUM mmol/L 4 8 5 2   CHLORIDE mmol/L 116* 109*   CO2 mmol/L 22 26   BUN mg/dL 47* 33*   CREATININE mg/dL 3 02* 2 07*   CALCIUM mg/dL 7 7* 8 7   ALK PHOS U/L  --  48*   ALT U/L  --  9   AST U/L  --  13       Results from last 7 days  Lab Units 08/29/18  1648   INR  1 08               * I Have Reviewed All Lab Data Listed Above  * Additional Pertinent Lab Tests Reviewed:  Josh 66 Admission  Reviewed    Imaging:  Imaging Reports Reviewed Today Include: CT head     Recent Cultures (last 7 days):           Last 24 Hours Medication List:     Current Facility-Administered Medications:  acetaminophen 650 mg Rectal Q4H PRN Dane Maxim, CRNP    aspirin 300 mg Rectal Daily Dane Maxim, CRNP    enoxaparin 30 mg Subcutaneous Q24H Josephine Ferris MD    morphine injection 1 mg Intravenous Q3H PRN Dane Maxim, CRNP    ondansetron 4 mg Intravenous Q4H PRN Dane Maxim, CRNP    sodium chloride 100 mL/hr Intravenous Continuous Dane Maxim, CRNP Last Rate: 100 mL/hr (08/31/18 1024)        Today, Patient Was Seen By: HOLLY Guardado    ** Please Note: Dictation voice to text software may have been used in the creation of this document   **

## 2018-08-31 NOTE — SOCIAL WORK
Pt discussed in care coordination rounds  Pt qualifies for [facility] and has been placed on same by [facility] ARAM JOHNSON to follow as needed

## 2018-08-31 NOTE — PLAN OF CARE

## 2018-08-31 NOTE — ASSESSMENT & PLAN NOTE
· Normochromic normocytic  · Dropped in [facility] this AM  Suspected 2/2 intracranial bleeding  · Patient will be placed on hospice  No transfusion at this time

## 2018-08-31 NOTE — HOSPICE NOTE
Pt evaluated for inpatient hospice care  Pt has declined since admission Noted onset of fever 101 9 pt remains non verbal  Minimally responsive but unable to move pt without her crying in pain  Had Deanne Juarez RN administer Morphine  As ordered Pt continued with pain with repositioning l Discussed case with Dr Faith Mckeon and Octavio Barker  Discussed inpatient hospice with family and going to hospice House in [facility]  They do not want pt to go that far  Will do inpatient hospice at [facility]   PACS notified to  Transfer pt to hospice care with CVA

## 2018-09-01 PROBLEM — R25.1 SHAKING: Status: ACTIVE | Noted: 2018-01-01

## 2018-09-01 NOTE — ASSESSMENT & PLAN NOTE
· Patient not currently in distress  · Continue ATC and prn Morphine for pain and Ativan/haldol prn for anxiety  · Continue supportive care

## 2018-09-01 NOTE — PLAN OF CARE
COPING     Pt/Family able to verbalize concerns and demonstrate effective coping strategies Progressing     Will report anxiety at manageable levels Progressing        DEATH & DYING     Pt/Family communicate acceptance of impending death and expresses psychological comfort and peace Progressing        Potential for Falls     Patient will remain free of falls Progressing        Prexisting or High Potential for Compromised Skin Integrity     Skin integrity is maintained or improved Progressing        SPIRITUAL CARE     Pt/Family able to move forward in process of forgiving self, others and/or higher power Progressing     Patient feels balance and connection with others and/or higher power that empowers the self during times of loss, guilt and fear Progressing

## 2018-09-01 NOTE — PROGRESS NOTES
Progress Note - Amrit Norton 1933, 80 y o  female MRN: [id number]    Unit/Bed#: -Lucian Encounter: [id number]    Primary Care Provider: HOLLY Bone   Date and time admitted to hospital: 2018  1:17 PM        Shaking   Assessment & Plan    Suspect due anxiety vs seizures  Continue ativan prn        * Hospice care   Assessment & Plan    · Patient not currently in distress  · Continue ATC and prn Morphine for pain and Ativan/haldol prn for anxiety  · Continue supportive care  VTE Pharmacologic Prophylaxis:   Pharmacologic: N/A, patient on hospice  Mechanical VTE Prophylaxis in Place: No    Patient Centered Rounds: I have performed bedside rounds with nursing staff today  Discussions with Specialists or Other Care Team Provider: Nursing    Education and Discussions with Family / Patient: Patient's daughter at bedside  Time Spent for Care: 30 minutes  More than 50% of total time spent on counseling and coordination of care as described above  Current Length of Stay: 1 day(s)    Current Patient Status: Inpatient   Certification Statement: The patient will continue to require additional inpatient hospital stay due to hospice care    Discharge Plan: Inpatient hospice    Code Status: Level 4 - Comfort Care      Subjective:   Patient asleep, not in distress  No overnight events per nursing  Patient's daughter reports intermittent shaking episodes that she first noted this morning  Objective:     Vitals:   Temp (24hrs), Av 6 °F (37 °C), Min:97 6 °F (36 4 °C), Max:99 4 °F (37 4 °C)    HR:  [] 100  Resp:  [16-18] 18  BP: (120-159)/(61-83) 159/83  SpO2:  [90 %-93 %] 90 %  There is no height or weight on file to calculate BMI  Input and Output Summary (last 24 hours):        Intake/Output Summary (Last 24 hours) at 18 1013  Last data filed at 18 0601   Gross per 24 hour   Intake                0 ml   Output              700 ml   Net -700 ml       Physical Exam:     Physical Exam   Constitutional: No distress  Neck: Neck supple  Cardiovascular: Regular rhythm  Pulmonary/Chest: No respiratory distress  +Decreased BS throughout   Abdominal: She exhibits no distension  There is no tenderness  Hypoactive bowel sounds   Musculoskeletal: She exhibits no edema  Neurological:   unresponsive   Skin: No erythema  Additional Data:     Labs:      Results from last 7 days  Lab Units 08/31/18  0515  08/30/18  0434   WBC Thousand/uL 8 90  --  8 90   HEMOGLOBIN g/dL 6 2*  < > 8 9*   HEMATOCRIT % 18 2*  < > 26 4*   PLATELETS Thousands/uL 128*  --  174   NEUTROS PCT %  --   --  83*   LYMPHS PCT %  --   --  12*   MONOS PCT %  --   --  5   EOS PCT %  --   --  0   < > = values in this interval not displayed  Results from last 7 days  Lab Units 08/31/18  0515 08/30/18  0434   SODIUM mmol/L 146* 143   POTASSIUM mmol/L 4 8 5 2   CHLORIDE mmol/L 116* 109*   CO2 mmol/L 22 26   BUN mg/dL 47* 33*   CREATININE mg/dL 3 02* 2 07*   CALCIUM mg/dL 7 7* 8 7   ALK PHOS U/L  --  48*   ALT U/L  --  9   AST U/L  --  13       Results from last 7 days  Lab Units 08/29/18  1648   INR  1 08                 * I Have Reviewed All Lab Data Listed Above  * Additional Pertinent Lab Tests Reviewed: Josh 66 Admission Reviewed      Recent Cultures (last 7 days):           Last 24 Hours Medication List:     Current Facility-Administered Medications:  atropine 2 drop Sublingual Q4H PRN Dharmesh Eid MD   haloperidol lactate 1 mg Intravenous Q1H PRN Dharmesh Eid MD   LORazepam 1 mg Intravenous Q1H PRN Dharmesh Eid MD   morphine injection 2 mg Intravenous Q1H PRN Dharmesh Eid MD   morphine injection 2 mg Intravenous Q6H Ochoa Harding MD        Today, Patient Was Seen By: Benjamin Stubbs PA-C    ** Please Note: Dictation voice to text software may have been used in the creation of this document   **

## 2018-09-01 NOTE — CASE MANAGEMENT
8/31 THIS PATIENT IS A TRANSFER TO  HOSPICE FROM HER MS STATUS AND STAY AT [facility] FROM 8/29-8/31      Jody Adams RN  9/1/18

## 2018-09-01 NOTE — HOSPICE NOTE
Eval by hospice nurse  Continues to qualify ]for inpt status  Temp 103 8  Skin hot to touch  Feet cool slight mottling noted   Other vs 154/86  108  20  Pt moans to touch  Instructed use of prn doses of morphine for any care for pt comfort  Resting peacefully when undisturbed  Family at bedside

## 2018-09-02 NOTE — PROGRESS NOTES
Progress Note - Adam Simpson 1933, 80 y o  female MRN: [id number]    Unit/Bed#: -01 Encounter: [id number]    Primary Care Provider: HOLLY Butt   Date and time admitted to hospital: 2018  1:17 PM        * Hospice care   Assessment & Plan    · Patient resting comfortably  · Continue ATC and prn Morphine for pain and Ativan/haldol prn for anxiety  · Continue supportive care  Shaking   Assessment & Plan    Suspect due anxiety/distress, less likely seizures  Continue ativan prn          VTE Pharmacologic Prophylaxis:   Pharmacologic: Pharmacologic VTE Prophylaxis contraindicated due to hospice  Mechanical VTE Prophylaxis in Place: No    Patient Centered Rounds: I have performed bedside rounds with nursing staff today  Discussions with Specialists or Other Care Team Provider: Nursing    Education and Discussions with Family / Patient: Patient's daughter at bedside    Time Spent for Care: 15 minutes  More than 50% of total time spent on counseling and coordination of care as described above  Current Length of Stay: 2 day(s)    Current Patient Status: Inpatient   Certification Statement: The patient will continue to require additional inpatient hospital stay due to inpatient hospice    Discharge Plan: Inpatient hospice    Code Status: Level 4 - Comfort Care      Subjective:   Patient's daughter reports that shaking episodes less  Noted a few this morning but only lasting a few seconds  Patient resting comfortably currently  Objective:     Vitals:   Temp (24hrs), Av 4 °F (36 3 °C), Min:97 3 °F (36 3 °C), Max:97 5 °F (36 4 °C)    HR:  [101-103] 101  Resp:  [16-20] 16  BP: (166)/(68) 166/68  SpO2:  [76 %-82 %] 82 %  There is no height or weight on file to calculate BMI  Input and Output Summary (last 24 hours):        Intake/Output Summary (Last 24 hours) at 18 0743  Last data filed at 18 0500   Gross per 24 hour   Intake                0 ml   Output 700 ml   Net             -700 ml       Physical Exam:     Physical Exam   Constitutional: No distress  HENT:   Head: Normocephalic and atraumatic  Oral mucosa dry   Neck: Neck supple  Cardiovascular: Regular rhythm  tachycardic   Pulmonary/Chest: No respiratory distress  She has no wheezes  She has no rales  Abdominal: She exhibits no distension  Musculoskeletal: She exhibits no edema  Skin: Skin is warm and dry  No erythema  Additional Data:     Labs:      Results from last 7 days  Lab Units 08/31/18  0515  08/30/18  0434   WBC Thousand/uL 8 90  --  8 90   HEMOGLOBIN g/dL 6 2*  < > 8 9*   HEMATOCRIT % 18 2*  < > 26 4*   PLATELETS Thousands/uL 128*  --  174   NEUTROS PCT %  --   --  83*   LYMPHS PCT %  --   --  12*   MONOS PCT %  --   --  5   EOS PCT %  --   --  0   < > = values in this interval not displayed  Results from last 7 days  Lab Units 08/31/18  0515 08/30/18  0434   SODIUM mmol/L 146* 143   POTASSIUM mmol/L 4 8 5 2   CHLORIDE mmol/L 116* 109*   CO2 mmol/L 22 26   BUN mg/dL 47* 33*   CREATININE mg/dL 3 02* 2 07*   CALCIUM mg/dL 7 7* 8 7   ALK PHOS U/L  --  48*   ALT U/L  --  9   AST U/L  --  13       Results from last 7 days  Lab Units 08/29/18  1648   INR  1 08                   Recent Cultures (last 7 days):           Last 24 Hours Medication List:     Current Facility-Administered Medications:  acetaminophen 650 mg Rectal Q6H PRMARIO Manzanares MD   atropine 2 drop Sublingual Q4H PRMARIO Manzanares MD   haloperidol lactate 1 mg Intravenous Q1H PRMARIO Manzanares MD   LORazepam 1 mg Intravenous Q1H PRMARIO Manzanares MD   morphine injection 2 mg Intravenous Q1H PRMARIO Manzanares MD   morphine injection 2 mg Intravenous Q6H Lizzette Small MD        Today, Patient Was Seen By: Eric Hernandez PA-C    ** Please Note: Dictation voice to text software may have been used in the creation of this document   **

## 2018-09-02 NOTE — ASSESSMENT & PLAN NOTE
· Patient resting comfortably  · Continue ATC and prn Morphine for pain and Ativan/haldol prn for anxiety  · Continue supportive care

## 2018-09-03 PROBLEM — R25.1 SHAKING: Status: RESOLVED | Noted: 2018-01-01 | Resolved: 2018-01-01

## 2018-09-03 NOTE — NURSING NOTE
PT IS MAINTAINED ON HOSPICE PER ORDERS  SHE IS SLEEPING, AND RESP EASY  NO S/S OF NOTED PAIN  SPOKE WITH HER DAUGHTER, WHO WOULD NOT LIKE HER AT ANY TIME, POSITIONED ON HER RIGHT SIDE, '  SHE STATES THIS IS WHERE HER RECENT PELVIC FX WAS, AND I DO NOT WANT IT " secretions are lessened, since scopalamine patch was started earlier today    IV, IS MAINTAINED  WILL  CONT TO  ASSESS NEEDS  3 RAILS UP

## 2018-09-03 NOTE — PLAN OF CARE
Problem: Prexisting or High Potential for Compromised Skin Integrity  Goal: Skin integrity is maintained or improved  INTERVENTIONS:  - Identify patients at risk for skin breakdown  - Assess and monitor skin integrity  - Assess and monitor nutrition and hydration status  - Monitor labs (i e  albumin)  - Assess for incontinence   - Turn and reposition patient  - Assist with mobility/ambulation  - Relieve pressure over bony prominences  - Avoid friction and shearing  - Provide appropriate hygiene as needed including keeping skin clean and dry  - Evaluate need for skin moisturizer/barrier cream  - Collaborate with interdisciplinary team (i e  Nutrition, Rehabilitation, etc )   - Patient/family teaching   Outcome: Progressing  CONTINUE TO TURN Q2 HRS

## 2018-09-03 NOTE — PROGRESS NOTES
Progress Note - Titi Briggs 1933, 80 y o  female MRN: [id number]    Unit/Bed#: -01 Encounter: [id number]    Primary Care Provider: HOLLY Ochoa   Date and time admitted to hospital: 2018  1:17 PM        * Hospice care   Assessment & Plan    · Patient resting comfortably  · Continue ATC and prn Morphine for pain and Ativan/haldol prn for anxiety  · Continue supportive care  · Case was discussed with Hospice Nurse        Shaking-resolved as of 9/3/2018   Assessment & Plan    Suspect due anxiety/distress, less likely seizures  Continue ativan prn            VTE Pharmacologic Prophylaxis: Pharmacologic: Pharmacologic VTE Prophylaxis contraindicated due to hospice    Patient Centered Rounds: I have performed bedside rounds with nursing staff today  Discussions with Specialists or Other Care Team Provider: Hospice Nurse   Education and Discussions with Family / Patient: Daughter at bedside    Time Spent for Care: 15 minutes  More than 50% of total time spent on counseling and coordination of care as described above  Current Length of Stay: 3 day(s)    Current Patient Status: Inpatient   Certification Statement: The patient will continue to require additional inpatient hospital stay due to Inpatient Hospice, actively dying      Code Status: Level 4 - Comfort Care    Subjective:   Patient seen by family at bedside  No current issues per daughter  Questions answered    Objective:     Vitals:   Temp (24hrs), Av 5 °F (36 4 °C), Min:97 5 °F (36 4 °C), Max:97 5 °F (36 4 °C)    HR:  [99] 99  Resp:  [18] 18  BP: (170)/(78) 170/78  SpO2:  [90 %] 90 %  Body mass index is 22 31 kg/m²  Input and Output Summary (last 24 hours):        Intake/Output Summary (Last 24 hours) at 18 1340  Last data filed at 18 0700   Gross per 24 hour   Intake                0 ml   Output              700 ml   Net             -700 ml       Physical Exam:     Physical Exam  Head: dry oral mucosa  Cardiovascular: tachy  Pulmonary: no wheezes  Abd: no distension  Skin: warm    Additional Data:     Labs:      Results from last 7 days  Lab Units 08/31/18  0515  08/30/18  0434   WBC Thousand/uL 8 90  --  8 90   HEMOGLOBIN g/dL 6 2*  < > 8 9*   HEMATOCRIT % 18 2*  < > 26 4*   PLATELETS Thousands/uL 128*  --  174   NEUTROS PCT %  --   --  83*   LYMPHS PCT %  --   --  12*   MONOS PCT %  --   --  5   EOS PCT %  --   --  0   < > = values in this interval not displayed  Results from last 7 days  Lab Units 08/31/18  0515 08/30/18  0434   SODIUM mmol/L 146* 143   POTASSIUM mmol/L 4 8 5 2   CHLORIDE mmol/L 116* 109*   CO2 mmol/L 22 26   BUN mg/dL 47* 33*   CREATININE mg/dL 3 02* 2 07*   CALCIUM mg/dL 7 7* 8 7   ALK PHOS U/L  --  48*   ALT U/L  --  9   AST U/L  --  13       Results from last 7 days  Lab Units 08/29/18  1648   INR  1 08       Last 24 Hours Medication List:     Current Facility-Administered Medications:  acetaminophen 650 mg Rectal Q6H PRN Dharmesh Eid MD   atropine 2 drop Sublingual Q4H PRN Dharmesh Eid MD   haloperidol lactate 1 mg Intravenous Q1H PRN Dharmesh Eid MD   LORazepam 1 mg Intravenous Q1H PRN Dharmesh Eid MD   morphine injection 2 mg Intravenous Q1H PRN Dharmesh Eid MD   morphine injection 2 mg Intravenous Q4H Benjamin Stubbs PA-C   scopolamine 1 patch Transdermal Q72H Benjamin Stubbs PA-C        Today, Patient Was Seen By: Marlyn Walters PA-C    ** Please Note: Dictation voice to text software may have been used in the creation of this document   **

## 2018-09-03 NOTE — PLAN OF CARE
Problem: COPING  Goal: Will report anxiety at manageable levels  INTERVENTIONS:  - Administer medication as ordered  - Teach and encourage coping skills  - Provide emotional support  - Assess patient/family for anxiety and ability to cope   Outcome: Progressing  PT COMFORTABLE

## 2018-09-03 NOTE — ASSESSMENT & PLAN NOTE
· Patient resting comfortably  · Continue ATC and prn Morphine for pain and Ativan/haldol prn for anxiety  · Continue supportive care    · Case was discussed with Hospice Nurse

## 2018-09-03 NOTE — HOSPICE NOTE
Eval by hospice  Pt remains GIP appropriate as she continues to decline skin hot to touch temp no longer registering  /84 128  20  Appears peaceful and comfortable   Episodes of shaking subsided  Pt unresponsive and continues to actively pass  Too unstable to transfer at this time  Update to Nurse Baltazar Awkward  Aware to notify hospice of any change in condition or concerns

## 2018-09-03 NOTE — PLAN OF CARE
Problem: COPING  Goal: Pt/Family able to verbalize concerns and demonstrate effective coping strategies  INTERVENTIONS:  - Assist patient/family to identify coping skills, available support systems and cultural and spiritual values  - Provide emotional support, including active listening and acknowledgement of concerns of patient and caregivers  - Reduce environmental stimuli, as able  - Provide patient education  - Assess for spiritual pain/suffering and initiate spiritual care, including notification of Pastoral Care or devaughn based community as needed  - Assess effectiveness of coping strategies   Outcome: Progressing  DAUGHTER SITS WITH PT OFFERED [facility]OTIONAL [address]

## 2018-09-03 NOTE — PLAN OF CARE
Problem: Potential for Falls  Goal: Patient will remain free of falls  INTERVENTIONS:  - Assess patient frequently for physical needs  -  Identify cognitive and physical deficits and behaviors that affect risk of falls    -  Doon fall precautions as indicated by assessment   - Educate patient/family on patient safety including physical limitations  - Instruct patient to call for assistance with activity based on assessment  - Modify environment to reduce risk of injury  - Consider OT/PT consult to assist with strengthening/mobility   Outcome: Progressing

## 2018-09-04 NOTE — NURSING NOTE
Pt  @ 9030 9/3/18  Family bedside, on hospice, ANA notified, pt not a candidate  Prepared body for  home

## 2018-09-04 NOTE — PROGRESS NOTES
Discharging Physician / Practitioner: Marita Brenner  PCP: Marita Marcelo  Admission Date: 2018  Discharge Date: 2019    Resolved Problems  Date Reviewed: 2018    None          Consultations During Hospital Stay:  · Orthopedic  · Renal  · Neurology  · Hospice    Procedures Performed:   · Initial CT scan of the head showed no acute intracranial hemorrhage or fracture line seen, small subgaleal hematoma in the right frontal region  · CT of the spine showed no acute fracture or traumatic subluxation seen, mild degenerative changes seen in the cervical spine  · X-ray of the hip and pelvis showed no acute bony injury to the left hip acute fractures of the right superior and inferior pubic rami  · Chest x-ray showed normal chest evaluation for age 80 years  · Repeat CT of the head on 2018 showed development of large areas of subacute ischemic changes in the occipital parietal region bilaterally  Significant Findings / Test Results:   · No surgical intervention required for pubic rami fracture, patient is okay for toe-touch to the right lower extremity  · Acute kidney injury believed to be due to volume depletion as patient was on ARB and HCTZ  · Neurology consult was performed secondary to repeat CT scan of the head, after discussion with family patient was placed on comfort care with hospice consult  · Hospice consult for hospice inpatient  Incidental Findings:   · None     Test Results Pending at Discharge (will require follow up): · None     Outpatient Tests Requested:  · None    Complications:  No true complication, patient did  on 2018 at 9:50 p m  with family at the bedside      Reason for Admission:   Patient was at her personal care home and stood up without her walker and fell with complaints of right hip pain on initial exam     Hospital Course:     Giovanna Granda is a 80 y o  female patient who originally presented to the hospital on 2018 due to mechanical fall  Patient has a history of advanced Alzheimer's dementia, and multiple mechanical falls in the past   Patient lives at a personal care facility and was brought into the emergency department after a mechanical fall  She appeared to be in pain and imaging studies revealed multiple pubic rami fractures on the right  Due to the patient's advanced Alzheimer's dementia, she is unable to provide a history  Majority of the history is obtained from medical record,  and son who were both at bedside  Patient was given pain medications in the emergency department and her pain appears to be well controlled  Given that she has had numerous falls at her personal care facility, family are considering nursing home placement  However patient had continue to decline past her baseline Alzheimer's dementia  Patient had become increasingly lethargic  Family did inquire of worsening condition in which a CT scan of her head was performed on 2018 that did show development of a large area of a subacute ischemic change in her occipital and parietal region bilaterally right greater than left  At that time Neurology was consulted, and further discussion for the patient quality of life did result in order for comfort care and hospice  Patient was placed on hospice on 2018  She had been undergoing hospice care as an inpatient and followed all hospice orders  Nurse was called to patient's room at 9:50 p m  on 2018  Patient was pronounced dead at that time  Please see above list of diagnoses and related plan for additional information  Condition at Discharge:   Discharge Day Visit / Exam:     Subjective:  Patient is   Vitals:    Exam:   Physical Exam   Eyes:   Pupils are fixed   Cardiovascular:   No heart sounds heard or pulses palpated   Pulmonary/Chest:   No respirations noted       Discussion with Family:   Family at the bedside      Discharge instructions/Information to patient and family:   See after visit summary for information provided to patient and family  Provisions for Follow-Up Care:  See after visit summary for information related to follow-up care and any pertinent home health orders  Disposition:     Other: To  home    For Discharges to [address] [facility]:   · Not Applicable to this Patient - Not Applicable to this Patient    Planned Readmission:   No     Discharge Statement:  I spent 15 minutes discharging the patient  This time was spent on the day of discharge  I had direct contact with the patient on the day of discharge       Discharge Medications:  None    ** Please Note: This note has been constructed using a voice recognition system **

## 2018-09-11 NOTE — DISCHARGE SUMMARY
Discharging Physician / Practitioner: Dharmesh Eid MD  PCP: Bridget Menendez, [address]   Admission Date:   Admission Orders     Ordered        18 1505  Inpatient Admission  Once             Discharge Date: 18    Resolved Problems  Date Reviewed: 2018          Resolved    Shaking 9/3/2018     Resolved by  Xi Amin 81 Stay:  · none    Procedures Performed:     · none    Significant Findings / Test Results:     · none    Incidental Findings:   · none     Test Results Pending at Discharge (will require follow up):   · none     Outpatient Tests Requested:  · none    Complications:  none    Reason for Admission: hospice care    Hospital Course:     Alisia Germain is a 80 y o  female patient who originally presented to the hospital on 2018 due to A fall and pubic rami fracture  She was noted to have a CVA and was subsequently placed on hospice care given her advanced dementia  Please see previously dictated discharge summary for her previous hospital course  Patient was under the care of hospice and subsequently   Please see medical record for further details  Please see above list of diagnoses and related plan for additional information  Condition at Discharge:      Discharge Day Visit / Exam:     * Please refer to separate progress note for these details *    Discussion with Family: n/a    Discharge instructions/Information to patient and family:   See after visit summary for information provided to patient and family  Provisions for Follow-Up Care:  See after visit summary for information related to follow-up care and any pertinent home health orders  Disposition:     Other:     For Discharges to [facility] SNF:   · Not Applicable to this Patient - Not Applicable to this Patient    Planned Readmission: n/a     Discharge Statement:  I spent 15 minutes discharging the patient   This time was spent on the day of discharge  I had direct contact with the patient on the day of discharge  Greater than 50% of the total time was spent examining patient, answering all patient questions, arranging and discussing plan of care with patient as well as directly providing post-discharge instructions  Additional time then spent on discharge activities  Discharge Medications:  See after visit summary for reconciled discharge medications provided to patient and family        ** Please Note: This note has been constructed using a voice recognition system **

## 2023-02-01 NOTE — PROGRESS NOTES
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  Right greater than left  2    Closed fracture of multiple areas of pubic rami greater on the right both inferior and superior  3    Multiple falls  4  Hospice care  5  Advanced Alzheimer's dementia  6  Encephalopathy secondary to CVA  7  Anemia  8  Acute kidney injury  9  Hyperlipidemia  10  Hypertension    Pronouncement of Death: I was contacted by nursing staff to evaluate patient found without vital signs  Patient is identified visually and identification confirmed with identification bracelet  The patient is laying in bed with all lines and tubes intact  The patient is examined personally and no heart sounds heard nor pulse detected  No breath sounds after 2 minutes auscultation  Pupils are fixed   No corneal reflex present  The patient is pronounced dead on this date and time        - HOLLY Jhaveri None